# Patient Record
Sex: MALE | Race: WHITE | NOT HISPANIC OR LATINO | URBAN - METROPOLITAN AREA
[De-identification: names, ages, dates, MRNs, and addresses within clinical notes are randomized per-mention and may not be internally consistent; named-entity substitution may affect disease eponyms.]

---

## 2022-09-22 ENCOUNTER — INPATIENT (INPATIENT)
Facility: HOSPITAL | Age: 77
LOS: 1 days | Discharge: HOME | DRG: 291 | End: 2022-09-24
Attending: INTERNAL MEDICINE | Admitting: INTERNAL MEDICINE
Payer: MEDICARE

## 2022-09-22 VITALS
WEIGHT: 134.92 LBS | SYSTOLIC BLOOD PRESSURE: 104 MMHG | TEMPERATURE: 99 F | OXYGEN SATURATION: 97 % | HEIGHT: 63 IN | RESPIRATION RATE: 17 BRPM | DIASTOLIC BLOOD PRESSURE: 69 MMHG | HEART RATE: 92 BPM

## 2022-09-22 DIAGNOSIS — Z90.79 ACQUIRED ABSENCE OF OTHER GENITAL ORGAN(S): Chronic | ICD-10-CM

## 2022-09-22 DIAGNOSIS — I48.92 UNSPECIFIED ATRIAL FLUTTER: ICD-10-CM

## 2022-09-22 DIAGNOSIS — J44.9 CHRONIC OBSTRUCTIVE PULMONARY DISEASE, UNSPECIFIED: ICD-10-CM

## 2022-09-22 DIAGNOSIS — I50.9 HEART FAILURE, UNSPECIFIED: ICD-10-CM

## 2022-09-22 LAB
ACANTHOCYTES BLD QL SMEAR: SLIGHT — SIGNIFICANT CHANGE UP
ALBUMIN SERPL ELPH-MCNC: 3.5 G/DL — SIGNIFICANT CHANGE UP (ref 3.3–5)
ALP SERPL-CCNC: 97 U/L — SIGNIFICANT CHANGE UP (ref 40–120)
ALT FLD-CCNC: 64 U/L — HIGH (ref 10–45)
ANION GAP SERPL CALC-SCNC: 13 MMOL/L — SIGNIFICANT CHANGE UP (ref 5–17)
ANISOCYTOSIS BLD QL: SLIGHT — SIGNIFICANT CHANGE UP
APPEARANCE UR: CLEAR — SIGNIFICANT CHANGE UP
APTT BLD: 28.7 SEC — SIGNIFICANT CHANGE UP (ref 27.5–35.5)
AST SERPL-CCNC: 42 U/L — HIGH (ref 10–40)
BACTERIA # UR AUTO: PRESENT /HPF
BASOPHILS # BLD AUTO: 0 K/UL — SIGNIFICANT CHANGE UP (ref 0–0.2)
BASOPHILS NFR BLD AUTO: 0 % — SIGNIFICANT CHANGE UP (ref 0–2)
BILIRUB SERPL-MCNC: 1.1 MG/DL — SIGNIFICANT CHANGE UP (ref 0.2–1.2)
BILIRUB UR-MCNC: NEGATIVE — SIGNIFICANT CHANGE UP
BUN SERPL-MCNC: 16 MG/DL — SIGNIFICANT CHANGE UP (ref 7–23)
CALCIUM SERPL-MCNC: 9.2 MG/DL — SIGNIFICANT CHANGE UP (ref 8.4–10.5)
CHLORIDE SERPL-SCNC: 100 MMOL/L — SIGNIFICANT CHANGE UP (ref 96–108)
CK MB CFR SERPL CALC: 1.2 NG/ML — SIGNIFICANT CHANGE UP (ref 0–6.7)
CK SERPL-CCNC: 44 U/L — SIGNIFICANT CHANGE UP (ref 30–200)
CO2 SERPL-SCNC: 26 MMOL/L — SIGNIFICANT CHANGE UP (ref 22–31)
COLOR SPEC: YELLOW — SIGNIFICANT CHANGE UP
CREAT SERPL-MCNC: 0.63 MG/DL — SIGNIFICANT CHANGE UP (ref 0.5–1.3)
DACRYOCYTES BLD QL SMEAR: SLIGHT — SIGNIFICANT CHANGE UP
DIFF PNL FLD: NEGATIVE — SIGNIFICANT CHANGE UP
EGFR: 99 ML/MIN/1.73M2 — SIGNIFICANT CHANGE UP
ELLIPTOCYTES BLD QL SMEAR: SLIGHT — SIGNIFICANT CHANGE UP
EOSINOPHIL # BLD AUTO: 0.32 K/UL — SIGNIFICANT CHANGE UP (ref 0–0.5)
EOSINOPHIL NFR BLD AUTO: 2.6 % — SIGNIFICANT CHANGE UP (ref 0–6)
EPI CELLS # UR: SIGNIFICANT CHANGE UP /HPF (ref 0–5)
GIANT PLATELETS BLD QL SMEAR: PRESENT — SIGNIFICANT CHANGE UP
GLUCOSE SERPL-MCNC: 138 MG/DL — HIGH (ref 70–99)
GLUCOSE UR QL: NEGATIVE — SIGNIFICANT CHANGE UP
HCT VFR BLD CALC: 36.1 % — LOW (ref 39–50)
HGB BLD-MCNC: 11.6 G/DL — LOW (ref 13–17)
INR BLD: 1.16 — SIGNIFICANT CHANGE UP (ref 0.88–1.16)
KETONES UR-MCNC: NEGATIVE — SIGNIFICANT CHANGE UP
LEUKOCYTE ESTERASE UR-ACNC: NEGATIVE — SIGNIFICANT CHANGE UP
LYMPHOCYTES # BLD AUTO: 0.96 K/UL — LOW (ref 1–3.3)
LYMPHOCYTES # BLD AUTO: 7.8 % — LOW (ref 13–44)
MANUAL SMEAR VERIFICATION: SIGNIFICANT CHANGE UP
MCHC RBC-ENTMCNC: 23.9 PG — LOW (ref 27–34)
MCHC RBC-ENTMCNC: 32.1 GM/DL — SIGNIFICANT CHANGE UP (ref 32–36)
MCV RBC AUTO: 74.4 FL — LOW (ref 80–100)
MICROCYTES BLD QL: SLIGHT — SIGNIFICANT CHANGE UP
MONOCYTES # BLD AUTO: 0.64 K/UL — SIGNIFICANT CHANGE UP (ref 0–0.9)
MONOCYTES NFR BLD AUTO: 5.2 % — SIGNIFICANT CHANGE UP (ref 2–14)
NEUTROPHILS # BLD AUTO: 10.4 K/UL — HIGH (ref 1.8–7.4)
NEUTROPHILS NFR BLD AUTO: 84.4 % — HIGH (ref 43–77)
NITRITE UR-MCNC: NEGATIVE — SIGNIFICANT CHANGE UP
NT-PROBNP SERPL-SCNC: 4187 PG/ML — HIGH (ref 0–300)
OVALOCYTES BLD QL SMEAR: SLIGHT — SIGNIFICANT CHANGE UP
PH UR: 5.5 — SIGNIFICANT CHANGE UP (ref 5–8)
PLAT MORPH BLD: ABNORMAL
PLATELET # BLD AUTO: 358 K/UL — SIGNIFICANT CHANGE UP (ref 150–400)
POLYCHROMASIA BLD QL SMEAR: SLIGHT — SIGNIFICANT CHANGE UP
POTASSIUM SERPL-MCNC: 3.6 MMOL/L — SIGNIFICANT CHANGE UP (ref 3.5–5.3)
POTASSIUM SERPL-SCNC: 3.6 MMOL/L — SIGNIFICANT CHANGE UP (ref 3.5–5.3)
PROT SERPL-MCNC: 6.4 G/DL — SIGNIFICANT CHANGE UP (ref 6–8.3)
PROT UR-MCNC: 30 MG/DL
PROTHROM AB SERPL-ACNC: 13.8 SEC — HIGH (ref 10.5–13.4)
RBC # BLD: 4.85 M/UL — SIGNIFICANT CHANGE UP (ref 4.2–5.8)
RBC # FLD: 15 % — HIGH (ref 10.3–14.5)
RBC BLD AUTO: ABNORMAL
RBC CASTS # UR COMP ASSIST: < 5 /HPF — SIGNIFICANT CHANGE UP
SARS-COV-2 RNA SPEC QL NAA+PROBE: NEGATIVE — SIGNIFICANT CHANGE UP
SCHISTOCYTES BLD QL AUTO: SLIGHT — SIGNIFICANT CHANGE UP
SODIUM SERPL-SCNC: 139 MMOL/L — SIGNIFICANT CHANGE UP (ref 135–145)
SP GR SPEC: >=1.03 — SIGNIFICANT CHANGE UP (ref 1–1.03)
TARGETS BLD QL SMEAR: SLIGHT — SIGNIFICANT CHANGE UP
TROPONIN T SERPL-MCNC: 0.01 NG/ML — SIGNIFICANT CHANGE UP (ref 0–0.01)
UROBILINOGEN FLD QL: 0.2 E.U./DL — SIGNIFICANT CHANGE UP
WBC # BLD: 12.32 K/UL — HIGH (ref 3.8–10.5)
WBC # FLD AUTO: 12.32 K/UL — HIGH (ref 3.8–10.5)
WBC UR QL: < 5 /HPF — SIGNIFICANT CHANGE UP

## 2022-09-22 PROCEDURE — 93970 EXTREMITY STUDY: CPT | Mod: 26

## 2022-09-22 PROCEDURE — 99285 EMERGENCY DEPT VISIT HI MDM: CPT | Mod: FS

## 2022-09-22 PROCEDURE — 71046 X-RAY EXAM CHEST 2 VIEWS: CPT | Mod: 26

## 2022-09-22 RX ORDER — AZITHROMYCIN 500 MG/1
500 TABLET, FILM COATED ORAL ONCE
Refills: 0 | Status: COMPLETED | OUTPATIENT
Start: 2022-09-22 | End: 2022-09-22

## 2022-09-22 RX ORDER — HEPARIN SODIUM 5000 [USP'U]/ML
INJECTION INTRAVENOUS; SUBCUTANEOUS
Qty: 25000 | Refills: 0 | Status: DISCONTINUED | OUTPATIENT
Start: 2022-09-22 | End: 2022-09-24

## 2022-09-22 RX ORDER — BUDESONIDE AND FORMOTEROL FUMARATE DIHYDRATE 160; 4.5 UG/1; UG/1
2 AEROSOL RESPIRATORY (INHALATION)
Refills: 0 | Status: DISCONTINUED | OUTPATIENT
Start: 2022-09-22 | End: 2022-09-24

## 2022-09-22 RX ORDER — TIOTROPIUM BROMIDE 18 UG/1
1 CAPSULE ORAL; RESPIRATORY (INHALATION) DAILY
Refills: 0 | Status: DISCONTINUED | OUTPATIENT
Start: 2022-09-22 | End: 2022-09-24

## 2022-09-22 RX ORDER — HEPARIN SODIUM 5000 [USP'U]/ML
2500 INJECTION INTRAVENOUS; SUBCUTANEOUS EVERY 6 HOURS
Refills: 0 | Status: DISCONTINUED | OUTPATIENT
Start: 2022-09-22 | End: 2022-09-24

## 2022-09-22 RX ORDER — FUROSEMIDE 40 MG
40 TABLET ORAL ONCE
Refills: 0 | Status: COMPLETED | OUTPATIENT
Start: 2022-09-22 | End: 2022-09-22

## 2022-09-22 RX ORDER — HEPARIN SODIUM 5000 [USP'U]/ML
5000 INJECTION INTRAVENOUS; SUBCUTANEOUS ONCE
Refills: 0 | Status: COMPLETED | OUTPATIENT
Start: 2022-09-22 | End: 2022-09-23

## 2022-09-22 RX ORDER — HEPARIN SODIUM 5000 [USP'U]/ML
5000 INJECTION INTRAVENOUS; SUBCUTANEOUS EVERY 6 HOURS
Refills: 0 | Status: DISCONTINUED | OUTPATIENT
Start: 2022-09-22 | End: 2022-09-24

## 2022-09-22 RX ORDER — FLUTICASONE FUROATE, UMECLIDINIUM BROMIDE AND VILANTEROL TRIFENATATE 200; 62.5; 25 UG/1; UG/1; UG/1
1 POWDER RESPIRATORY (INHALATION)
Qty: 0 | Refills: 0 | DISCHARGE

## 2022-09-22 RX ORDER — TIMOLOL 0.5 %
1 DROPS OPHTHALMIC (EYE)
Refills: 0 | Status: DISCONTINUED | OUTPATIENT
Start: 2022-09-22 | End: 2022-09-24

## 2022-09-22 RX ORDER — FUROSEMIDE 40 MG
40 TABLET ORAL
Refills: 0 | Status: DISCONTINUED | OUTPATIENT
Start: 2022-09-23 | End: 2022-09-24

## 2022-09-22 RX ORDER — LATANOPROST 0.05 MG/ML
1 SOLUTION/ DROPS OPHTHALMIC; TOPICAL
Qty: 0 | Refills: 0 | DISCHARGE

## 2022-09-22 RX ORDER — CEFTRIAXONE 500 MG/1
1000 INJECTION, POWDER, FOR SOLUTION INTRAMUSCULAR; INTRAVENOUS ONCE
Refills: 0 | Status: COMPLETED | OUTPATIENT
Start: 2022-09-22 | End: 2022-09-22

## 2022-09-22 RX ORDER — BUMETANIDE 0.25 MG/ML
1 INJECTION INTRAMUSCULAR; INTRAVENOUS
Qty: 0 | Refills: 0 | DISCHARGE

## 2022-09-22 RX ORDER — TIMOLOL 0.5 %
1 DROPS OPHTHALMIC (EYE)
Qty: 0 | Refills: 0 | DISCHARGE

## 2022-09-22 RX ORDER — LATANOPROST 0.05 MG/ML
1 SOLUTION/ DROPS OPHTHALMIC; TOPICAL AT BEDTIME
Refills: 0 | Status: DISCONTINUED | OUTPATIENT
Start: 2022-09-22 | End: 2022-09-24

## 2022-09-22 RX ADMIN — CEFTRIAXONE 100 MILLIGRAM(S): 500 INJECTION, POWDER, FOR SOLUTION INTRAMUSCULAR; INTRAVENOUS at 22:25

## 2022-09-22 RX ADMIN — AZITHROMYCIN 255 MILLIGRAM(S): 500 TABLET, FILM COATED ORAL at 22:44

## 2022-09-22 RX ADMIN — Medication 40 MILLIGRAM(S): at 22:44

## 2022-09-22 NOTE — ED ADULT NURSE REASSESSMENT NOTE - NS ED NURSE REASSESS COMMENT FT1
Patient is on his way to US on stretcher w/ SSA. Patient can go doppler off cardiac mon as per EARLENE Gusman.

## 2022-09-22 NOTE — ED PROVIDER NOTE - OBJECTIVE STATEMENT
78 y/o M pt with PMHx of HTN, lung ca s/p resection, COPD, emphysema, prostate CA s/p prostatectomy presents to ED c/o shortness of breath on exertion x2w and bilateral LE edema, sent in from primary care/cardiologist's office. Pt was on Norvasc, Metoprolol, and Chlorthalidone for his BP when he developed a swelling on his legs initially. He went to see his doctor and his meds was changed to Bumetanide.  Pt's swelling slightly subsided, but he still feels lousy and has frequent shortness of breath. He denies fever, chills, cough, abdominal pain, chest pain, or any other acute complaints. Per pt's son, his doctor did EKG in office today and was concerned it was abnormal. Doctor recommended pt come to [facility] ED for cardiac admission for further treatment. 78 y/o M pt with PMHx of HTN, lung ca s/p resection, COPD, emphysema, prostate CA s/p prostatectomy presents to ED c/o shortness of breath on exertion x2w and bilateral LE edema, sent in from primary care/cardiologist's office.  Pt's leg swelling slightly subsided for the past 4 days since he stopped Norvasc and started Bumetanide, but he still feels lousy, generally weak and has frequent shortness of breath. He denies fever, chills, cough, abdominal pain, chest pain, or any other acute complaints. Per pt's son, his doctor did EKG in office today and was concerned it was abnormal. Doctor recommended pt come to [facility] ED for cardiac admission for further treatment.

## 2022-09-22 NOTE — H&P ADULT - PROBLEM SELECTOR PLAN 1
p/w b/l LE edema and ARNOLD x2 weeks, bibasilar crackles in lower lung bases w/o wheezing. BNP 4187, Trop neg x1, CXR possible bullae in RLL (follow up official read), ECG atrial flutter HR 90bpm.  - Echocardiogram done at outpatient cards office: "severe MR, post directed, ?flail leaflet, ?endocarditis"  - repeat echocardiogram (ordered), possible PREETI for evaluation of MV and possible endocarditis  - NPO   - HOME Meds: Bumetanide 1 mg daily  - s/p Lasix 40 mg IV x1 in ED, hold home diuretic and continue diuresis with Lasix 40 mg IV BID   - core measures: strict I/Os and daily standing weights, fluid restriction <1.2L daily    #Severe MR  per MD note: echocardiogram in office revealing severe MR, ?flail leaflet, ?endocardititis  - follow up echocardiogram  - follow up blood cultures  - possible structural consult in AM

## 2022-09-22 NOTE — ED PROVIDER NOTE - NSICDXPASTMEDICALHX_GEN_ALL_CORE_FT
PAST MEDICAL HISTORY:  COPD (chronic obstructive pulmonary disease)     H/O emphysema     HTN (hypertension)     Lung cancer     Prostate CA

## 2022-09-22 NOTE — ED ADULT NURSE REASSESSMENT NOTE - NS ED NURSE REASSESS COMMENT FT1
Patient received from day shift RN, resting in bed, no complaints at this time. Family at bedside. Awaiting lab-r

## 2022-09-22 NOTE — H&P ADULT - PROBLEM SELECTOR PLAN 3
WBC: 12.32, afebrile. CXR with possible bullae in RLL (f/u official read) possible COPD exacerbation t/w Azithromycin 500 mg IV x1, Ceftriaxone 1G IV x1 for leukocytosis in ED  - follow up lactate in AM  - HOME Meds: Trelegy Ellipta  - continue with therapeutic interchange: Spiriva/Symbicort      Diet: NPO  DVT PPx: Heparin gtt  Code Status: FULL  Dispo: pending clinical course  PT consult ordered WBC: 12.32, afebrile. CXR with possible bullae v consolidation in RLL (f/u official read) t/w Azithromycin 500 mg IV x1, Ceftriaxone 1G IV x1 for leukocytosis in ED  - follow up lactate in AM  - c/w IV Ceftriaxone 2G daily as discussed with cardiology fellow

## 2022-09-22 NOTE — ED PROVIDER NOTE - CLINICAL SUMMARY MEDICAL DECISION MAKING FREE TEXT BOX
78 y/o M pt with PMHx of HTN, lung CA s/p resection, and prostate CA s/p prostatectomy presents to ED sent in from cardiologist's office for evaluation and most likely admission. Pt has dyspnea on exertion x 2w and bilateral LE edema. In ED, pt non-toxic appearing, VS stable, plan labs, EKG, CXR, cardiology consult, and most likely admit. 78 y/o M pt with PMHx of HTN, lung CA s/p resection, and prostate CA s/p prostatectomy presents to ED sent in from cardiologist's office for evaluation and most likely admission. Pt has dyspnea on exertion x 2w and bilateral LE edema. In ED, pt non-toxic appearing, VS stable, plan labs, EKG, CXR, cardiology consult, and most likely admit.  pt with new onset of CHF, A flutter with variable heart block, will admit to cardiology for further management, diuresis

## 2022-09-22 NOTE — ED PROVIDER NOTE - MUSCULOSKELETAL, MLM
Spine appears normal, range of motion is not limited, no muscle or joint tenderness, good pulses to bilateral LE, mild pedal edema, L > R.

## 2022-09-22 NOTE — H&P ADULT - NSHPSOCIALHISTORY_GEN_ALL_CORE
Tobacco: former smoker, quit 40 years ago  ETOH: denies  Drug Use: denies  Occupation: , full time  Marital Status:   Emergency Contact/HCP: Alan Argueta (son) [phone]

## 2022-09-22 NOTE — ED PROVIDER NOTE - NS ED ATTENDING STATEMENT MOD
This was a shared visit with the JUANPABLO. I reviewed and verified the documentation and independently performed the documented:

## 2022-09-22 NOTE — ED ADULT NURSE NOTE - OBJECTIVE STATEMENT
Pt presents to ED with cardiology referral for suspected endocarditis. Pt has noticed bilateral lower extremity swelling and intermittent SOB. Pt had echo and EKG done by cardiologist that were "abnormal" per pt report. Pt denies chest pain, fevers/chills, nausea, or dizziness. Pt resting on stretcher.

## 2022-09-22 NOTE — H&P ADULT - PROBLEM SELECTOR PLAN 4
- HOME Meds: Trelegy Ellipta  - continue with therapeutic interchange: Spiriva/Symbicort      Diet: NPO  DVT PPx: Heparin gtt  Code Status: FULL  Dispo: pending clinical course  PT consult ordered

## 2022-09-22 NOTE — H&P ADULT - HISTORY OF PRESENT ILLNESS
78 y/o Mohawk speaking male, former smoker (quit 40 yrs ago) with PMHx of HTN, lung ca s/p RUL lobectomy (02/2021 @ [facility]), COPD, emphysema, prostate CA s/p prostatectomy (03/2015 @ [facility]) who presents to [facility] ED with c/o shortness of breath on exertion x2 weeks and bilateral LE edema, sent in from primary care/cardiologist's office.  Pt's leg swelling slightly subsided for the past 4 days since he stopped Norvasc and started Bumetanide, but he still feels lousy, generally weak/fatigue and has frequent shortness of breath. He denies fever, chills, cough, abdominal pain, chest pain, or any other acute complaints. Per pt's son, his doctor did EKG in office today and was concerned it was abnormal. Doctor recommended pt come to [facility] ED for cardiac admission for further treatment.    Upon arrival to the ED patient's workup revealed: T98.4, /71, HR85, RR18, SpO2 99% on RA. Labs significant for WBC 12.32, H/H 11.6/36.1, AST/ALT 42/64, Troponin neg x1, BNP 4187, UA neg, COVID PCR neg. ECG atrial flutter 90 bpm. CXR possible bullae in RLL (follow up official read). Patient t/w Azithromycin 500 mg IV x1, Ceftriaxone 1G IV x1 for leukocytosis, and Lasix 40 mg IV x1 in the ED. Patient is now admitted to [facility] for new onset rate controlled atrial flutter and new onset CHF exacerbation.

## 2022-09-22 NOTE — ED PROVIDER NOTE - RESPIRATORY, MLM
Scattered wheezes, no labored breathing, no rales, no rhonchi. no labored breathing,  scattered crackles and wheezes, no rhonchi.

## 2022-09-22 NOTE — H&P ADULT - NSHPLABSRESULTS_GEN_ALL_CORE
11.6   12.32 )-----------( 358      ( 22 Sep 2022 20:11 )             36.1       09-22    139  |  100  |  16  ----------------------------<  138<H>  3.6   |  26  |  0.63    Ca    9.2      22 Sep 2022 20:11    TPro  6.4  /  Alb  3.5  /  TBili  1.1  /  DBili  x   /  AST  42<H>  /  ALT  64<H>  /  AlkPhos  97  09-22      PT/INR - ( 22 Sep 2022 20:11 )   PT: 13.8 sec;   INR: 1.16          PTT - ( 22 Sep 2022 20:11 )  PTT:28.7 sec    CARDIAC MARKERS ( 22 Sep 2022 20:11 )  x     / 0.01 ng/mL / 44 U/L / x     / 1.2 ng/mL        Urinalysis Basic - ( 22 Sep 2022 21:41 )    Color: Yellow / Appearance: Clear / SG: >=1.030 / pH: x  Gluc: x / Ketone: NEGATIVE  / Bili: Negative / Urobili: 0.2 E.U./dL   Blood: x / Protein: 30 mg/dL / Nitrite: NEGATIVE   Leuk Esterase: NEGATIVE / RBC: < 5 /HPF / WBC < 5 /HPF   Sq Epi: x / Non Sq Epi: 0-5 /HPF / Bacteria: Present /HPF

## 2022-09-22 NOTE — H&P ADULT - NS ATTEND AMEND GEN_ALL_CORE FT
Initial attending contact date 9.23.22 on morning bedside rounds. See attending addendum to HPI here for initial attending contact documentation.   See PA note written above, for details. I reviewed the PA documentation.  I have personally seen and examined this patient today. I reviewed vitals, labs, medications, cardiac studies and additional imaging.  I agree with the PA's findings and plans as written above with the following additions/amendments:  77M former smoker with essential HTN, lung ca s/p RUL lobectomy (02/2021 @ [facility]), COPD, emphysema, prostate CA s/p prostatectomy who presented with report of abnormal outpatient TTE notable for severe MR with possible flail leaflet. On presentation patient noted to be in new onset AFlutter, with elevated BNP 4K on admission, JAYESH and SOB.  He was given 40IV lasix and started on 40 IV lasix BID. CXR with possible infiltrate, initiated on CTX for possible CAP.  Patient admitted to cardiac telemetry for further  management.  ROS: Patient denies cardiopulmonary symptoms after overnight diuresis. The patient specifically denies CP, SOB, ARNOLD, orthopnea.  Physical Exam notable for: elderly patient laying flat at zero degrees in bed in NAD, flat neck veins, clear lungs, soft abdomen, no fluid wave detected, no pretibial pitting edema, no ankle edema, skin WWP  Plan for:  NPO for PREETI today  Heparin gtt for new onset aflutter rate controlled  Cont IV abx for coverage of suspected CAP  Lasix 40 IV BID today 9/23, anticipate transition to po diuretics on 9/24 AM  CTSx Dr Pena team ocnsulted for evaluation of flail  MV with 4+ MR  Will need EP consult for eval of new onset aflutter which is likely driven by MV disease  F/U infectious work up for leukocytosis, blood cultures NGTD  Antonio Calderon M.D.  Cardiology Attending

## 2022-09-22 NOTE — H&P ADULT - PROBLEM SELECTOR PLAN 2
currently in rate controlled atrial flutter, HR 80s, EKG: atrial flutter 90 bpm  - follow up AM ECG  - follow up AM thyroid function panel  - follow up echocardiogram, possible PREETI/DCCV (consult EP in AM)  - NPO after MN  - start Heparin gtt

## 2022-09-22 NOTE — H&P ADULT - NSICDXPASTMEDICALHX_GEN_ALL_CORE_FT
PAST MEDICAL HISTORY:  COPD (chronic obstructive pulmonary disease)     H/O emphysema     HTN (hypertension)     Lung cancer     Prostate CA      Home

## 2022-09-22 NOTE — H&P ADULT - ASSESSMENT
76 y/o Slovenian speaking male, former smoker (quit 40 yrs ago) with PMHx of HTN, lung ca s/p RUL lobectomy (02/2021 @ [facility]), COPD, emphysema, prostate CA s/p prostatectomy (03/2015 @ [facility]) who presents to [facility] ED with c/o shortness of breath on exertion x2 weeks and bilateral LE edema, sent in from primary care/cardiologist's office.  Pt's leg swelling slightly subsided for the past 4 days since he stopped Norvasc and started Bumetanide, but he still feels lousy, generally weak/fatigue and has frequent shortness of breath. He denies fever, chills, cough, abdominal pain, chest pain, or any other acute complaints. Per pt's son, his doctor did EKG in office today and was concerned it was abnormal. Doctor recommended pt come to [facility] ED for cardiac admission for further treatment.    Upon arrival to the ED patient's workup revealed: T98.4, /71, HR85, RR18, SpO2 99% on RA. Labs significant for WBC 12.32, H/H 11.6/36.1, AST/ALT 42/64, Troponin neg x1, BNP 4187, UA neg, COVID PCR neg. ECG atrial flutter 90 bpm. CXR possible bullae in RLL (follow up official read). Patient t/w Azithromycin 500 mg IV x1, Ceftriaxone 1G IV x1 for leukocytosis, and Lasix 40 mg IV x1 in the ED. Patient is now admitted to [facility] for new onset rate controlled atrial flutter and new onset CHF exacerbation.    Per MD note:  Echocardiogram 09/2022: severe MR post directed, ? flail leaflet, ? endocarditis

## 2022-09-22 NOTE — ED ADULT NURSE NOTE - NS ED NURSE TRANSPORT WITH
Cardiac Monitor/Defib/ACLS/Rescue Kit/O2/BVM/IV pump/ACLS Rescue Kit heparin drip, verified w/ RN Tatiana/Cardiac Monitor/Defib/ACLS/Rescue Kit/O2/BVM/IV pump/ACLS Rescue Kit

## 2022-09-22 NOTE — ED ADULT TRIAGE NOTE - CHIEF COMPLAINT QUOTE
Pt walked into ED per cards. pt c/o SOB, LE edema x2 weeks, and "concerning echo." "my white blood cells were 13 and he said he's concerned about endocarditis so he told me to come right away." Denies any fevers, coughing, or chest pain.

## 2022-09-23 DIAGNOSIS — I34.0 NONRHEUMATIC MITRAL (VALVE) INSUFFICIENCY: ICD-10-CM

## 2022-09-23 DIAGNOSIS — D72.829 ELEVATED WHITE BLOOD CELL COUNT, UNSPECIFIED: ICD-10-CM

## 2022-09-23 DIAGNOSIS — I50.33 ACUTE ON CHRONIC DIASTOLIC (CONGESTIVE) HEART FAILURE: ICD-10-CM

## 2022-09-23 LAB
A1C WITH ESTIMATED AVERAGE GLUCOSE RESULT: 6 % — HIGH (ref 4–5.6)
ANION GAP SERPL CALC-SCNC: 12 MMOL/L — SIGNIFICANT CHANGE UP (ref 5–17)
APTT BLD: 51.6 SEC — HIGH (ref 27.5–35.5)
APTT BLD: 62.6 SEC — HIGH (ref 27.5–35.5)
APTT BLD: 66.5 SEC — HIGH (ref 27.5–35.5)
BASOPHILS # BLD AUTO: 0.1 K/UL — SIGNIFICANT CHANGE UP (ref 0–0.2)
BASOPHILS NFR BLD AUTO: 0.6 % — SIGNIFICANT CHANGE UP (ref 0–2)
BUN SERPL-MCNC: 17 MG/DL — SIGNIFICANT CHANGE UP (ref 7–23)
CALCIUM SERPL-MCNC: 9.2 MG/DL — SIGNIFICANT CHANGE UP (ref 8.4–10.5)
CHLORIDE SERPL-SCNC: 99 MMOL/L — SIGNIFICANT CHANGE UP (ref 96–108)
CHOLEST SERPL-MCNC: 126 MG/DL — SIGNIFICANT CHANGE UP
CO2 SERPL-SCNC: 26 MMOL/L — SIGNIFICANT CHANGE UP (ref 22–31)
CREAT SERPL-MCNC: 0.69 MG/DL — SIGNIFICANT CHANGE UP (ref 0.5–1.3)
EGFR: 96 ML/MIN/1.73M2 — SIGNIFICANT CHANGE UP
EOSINOPHIL # BLD AUTO: 0.34 K/UL — SIGNIFICANT CHANGE UP (ref 0–0.5)
EOSINOPHIL NFR BLD AUTO: 2.1 % — SIGNIFICANT CHANGE UP (ref 0–6)
ESTIMATED AVERAGE GLUCOSE: 126 MG/DL — HIGH (ref 68–114)
GLUCOSE SERPL-MCNC: 124 MG/DL — HIGH (ref 70–99)
HCT VFR BLD CALC: 37.1 % — LOW (ref 39–50)
HCV AB S/CO SERPL IA: 0.03 S/CO — SIGNIFICANT CHANGE UP
HCV AB SERPL-IMP: SIGNIFICANT CHANGE UP
HDLC SERPL-MCNC: 50 MG/DL — SIGNIFICANT CHANGE UP
HGB BLD-MCNC: 11.8 G/DL — LOW (ref 13–17)
IMM GRANULOCYTES NFR BLD AUTO: 0.4 % — SIGNIFICANT CHANGE UP (ref 0–0.9)
LDH SERPL L TO P-CCNC: 209 U/L — SIGNIFICANT CHANGE UP (ref 50–242)
LIPID PNL WITH DIRECT LDL SERPL: 64 MG/DL — SIGNIFICANT CHANGE UP
LYMPHOCYTES # BLD AUTO: 0.89 K/UL — LOW (ref 1–3.3)
LYMPHOCYTES # BLD AUTO: 5.6 % — LOW (ref 13–44)
MAGNESIUM SERPL-MCNC: 2.1 MG/DL — SIGNIFICANT CHANGE UP (ref 1.6–2.6)
MCHC RBC-ENTMCNC: 23.6 PG — LOW (ref 27–34)
MCHC RBC-ENTMCNC: 31.8 GM/DL — LOW (ref 32–36)
MCV RBC AUTO: 74.2 FL — LOW (ref 80–100)
MONOCYTES # BLD AUTO: 1.33 K/UL — HIGH (ref 0–0.9)
MONOCYTES NFR BLD AUTO: 8.3 % — SIGNIFICANT CHANGE UP (ref 2–14)
NEUTROPHILS # BLD AUTO: 13.22 K/UL — HIGH (ref 1.8–7.4)
NEUTROPHILS NFR BLD AUTO: 83 % — HIGH (ref 43–77)
NON HDL CHOLESTEROL: 76 MG/DL — SIGNIFICANT CHANGE UP
NRBC # BLD: 0 /100 WBCS — SIGNIFICANT CHANGE UP (ref 0–0)
PLATELET # BLD AUTO: 371 K/UL — SIGNIFICANT CHANGE UP (ref 150–400)
POTASSIUM SERPL-MCNC: 3.9 MMOL/L — SIGNIFICANT CHANGE UP (ref 3.5–5.3)
POTASSIUM SERPL-SCNC: 3.9 MMOL/L — SIGNIFICANT CHANGE UP (ref 3.5–5.3)
RBC # BLD: 5 M/UL — SIGNIFICANT CHANGE UP (ref 4.2–5.8)
RBC # FLD: 14.8 % — HIGH (ref 10.3–14.5)
SODIUM SERPL-SCNC: 137 MMOL/L — SIGNIFICANT CHANGE UP (ref 135–145)
T4 AB SER-ACNC: 8.35 UG/DL — SIGNIFICANT CHANGE UP (ref 4.5–11.7)
TRIGL SERPL-MCNC: 59 MG/DL — SIGNIFICANT CHANGE UP
TSH SERPL-MCNC: 1.18 UIU/ML — SIGNIFICANT CHANGE UP (ref 0.27–4.2)
WBC # BLD: 15.95 K/UL — HIGH (ref 3.8–10.5)
WBC # FLD AUTO: 15.95 K/UL — HIGH (ref 3.8–10.5)

## 2022-09-23 PROCEDURE — 76377 3D RENDER W/INTRP POSTPROCES: CPT | Mod: 26

## 2022-09-23 PROCEDURE — 99223 1ST HOSP IP/OBS HIGH 75: CPT

## 2022-09-23 PROCEDURE — 93312 ECHO TRANSESOPHAGEAL: CPT | Mod: 26

## 2022-09-23 RX ORDER — CEFTRIAXONE 500 MG/1
2000 INJECTION, POWDER, FOR SOLUTION INTRAMUSCULAR; INTRAVENOUS
Refills: 0 | Status: DISCONTINUED | OUTPATIENT
Start: 2022-09-23 | End: 2022-09-24

## 2022-09-23 RX ADMIN — Medication 40 MILLIGRAM(S): at 06:00

## 2022-09-23 RX ADMIN — HEPARIN SODIUM 1200 UNIT(S)/HR: 5000 INJECTION INTRAVENOUS; SUBCUTANEOUS at 18:54

## 2022-09-23 RX ADMIN — BUDESONIDE AND FORMOTEROL FUMARATE DIHYDRATE 2 PUFF(S): 160; 4.5 AEROSOL RESPIRATORY (INHALATION) at 09:19

## 2022-09-23 RX ADMIN — Medication 1 DROP(S): at 06:56

## 2022-09-23 RX ADMIN — HEPARIN SODIUM 1200 UNIT(S)/HR: 5000 INJECTION INTRAVENOUS; SUBCUTANEOUS at 20:27

## 2022-09-23 RX ADMIN — BUDESONIDE AND FORMOTEROL FUMARATE DIHYDRATE 2 PUFF(S): 160; 4.5 AEROSOL RESPIRATORY (INHALATION) at 21:30

## 2022-09-23 RX ADMIN — Medication 1 DROP(S): at 18:17

## 2022-09-23 RX ADMIN — HEPARIN SODIUM 1200 UNIT(S)/HR: 5000 INJECTION INTRAVENOUS; SUBCUTANEOUS at 21:53

## 2022-09-23 RX ADMIN — LATANOPROST 1 DROP(S): 0.05 SOLUTION/ DROPS OPHTHALMIC; TOPICAL at 21:09

## 2022-09-23 RX ADMIN — HEPARIN SODIUM 1100 UNIT(S)/HR: 5000 INJECTION INTRAVENOUS; SUBCUTANEOUS at 08:45

## 2022-09-23 RX ADMIN — CEFTRIAXONE 100 MILLIGRAM(S): 500 INJECTION, POWDER, FOR SOLUTION INTRAMUSCULAR; INTRAVENOUS at 06:00

## 2022-09-23 RX ADMIN — HEPARIN SODIUM 1100 UNIT(S)/HR: 5000 INJECTION INTRAVENOUS; SUBCUTANEOUS at 07:10

## 2022-09-23 RX ADMIN — HEPARIN SODIUM 1100 UNIT(S)/HR: 5000 INJECTION INTRAVENOUS; SUBCUTANEOUS at 00:25

## 2022-09-23 RX ADMIN — HEPARIN SODIUM 5000 UNIT(S): 5000 INJECTION INTRAVENOUS; SUBCUTANEOUS at 00:25

## 2022-09-23 RX ADMIN — HEPARIN SODIUM 1200 UNIT(S)/HR: 5000 INJECTION INTRAVENOUS; SUBCUTANEOUS at 15:39

## 2022-09-23 RX ADMIN — Medication 40 MILLIGRAM(S): at 15:40

## 2022-09-23 RX ADMIN — HEPARIN SODIUM 2500 UNIT(S): 5000 INJECTION INTRAVENOUS; SUBCUTANEOUS at 15:52

## 2022-09-23 RX ADMIN — HEPARIN SODIUM 1100 UNIT(S)/HR: 5000 INJECTION INTRAVENOUS; SUBCUTANEOUS at 01:11

## 2022-09-23 NOTE — PROGRESS NOTE ADULT - PROBLEM SELECTOR PLAN 5
- HOME Meds: Trelegy Ellipta  - continue with therapeutic interchange: Spiriva/Symbicort      DVT PPx: Heparin gtt  Code Status: FULL  Dispo: pending clinical course  PT consult ordered - HOME Meds: Trelegy Ellipta  - continue with therapeutic interchange: Spiriva/Symbicort      DVT PPx: Heparin gtt  Code Status: FULL  Dispo: pending clinical course  PT consult ordered    Case d/w Dr. Anderson - HOME Meds: Trelegy Ellipta  - continue with therapeutic interchange: Spiriva/Symbicort      DVT PPx: Heparin gtt  Code Status: FULL  Dispo: pending clinical progression  PT consult ordered    Case d/w Dr. Anderson

## 2022-09-23 NOTE — PHYSICAL THERAPY INITIAL EVALUATION ADULT - GENERAL OBSERVATIONS, REHAB EVAL
Pt received semi supine in bed with +EKG, +IV, NAD, son present. Pt left as found, NAD, line intact, call bell in reach, son present, RN Nathalia currie.

## 2022-09-23 NOTE — PROGRESS NOTE ADULT - PROBLEM SELECTOR PLAN 1
-bibasilar crackles, mild JVD, trace b/l LE edema  - Outpatient echo 09/22: EF 57%, mild concentric LVH, severe eccentric posteriorly directed MR possible flail leaflet mitral anterior leaflet, moderate TR, mild pulm HTN  -continue diuresis with Lasix 40 mg IV BID   - core measures: strict I/Os and daily standing weights, fluid restriction <1.2L daily

## 2022-09-23 NOTE — CONSULT NOTE ADULT - SUBJECTIVE AND OBJECTIVE BOX
Surgeon: Dr. Corrales    Requesting Physician: Dr. Anderson    HISTORY OF PRESENT ILLNESS:  78 y/o Cuban speaking male, former smoker (quit 40 yrs ago) with PMHx of HTN, lung ca s/p RUL lobectomy (02/2021 @ [facility]), COPD, emphysema, prostate CA s/p prostatectomy (03/2015 @ [facility]) who presents to [facility] ED with c/o shortness of breath on exertion x2 weeks and bilateral LE edema, sent in from primary care/cardiologist's office.  Pt's leg swelling slightly subsided for the past 4 days since he stopped Norvasc and started Bumetanide. Per pt's son, his doctor did EKG in office today and was concerned it was abnormal. Doctor recommended pt come to [facility] ED for cardiac admission for further treatment. ECG in ER with atrial flutter 90 bpm. CXR with concern for possible bullae in RLL treated with Azithromycin 500 mg IV x1, Ceftriaxone 1G IV x1 for leukocytosis, and Lasix 40 mg IV x1 in the ED. Patient is now admitted to [facility] for new onset rate controlled atrial flutter and new onset CHF exacerbation. Structural Heart consulted 2/2 outside ECHO showing severe MR with concern for flail mitral leaflet vs vegetation. CTS Consulted for input on appropriate surgical intervention and planning.    Patient seen at bedside this morning, endorsing no acute complaints. Admits to ARNOLD, but patient denies current CP, SOB, Palpitations, PND, Dizziness, N/V, Fever, Night Sweats, Chills.       PAST MEDICAL & SURGICAL HISTORY:  HTN (hypertension)      Lung cancer      COPD (chronic obstructive pulmonary disease)      H/O emphysema      Prostate CA      H/O prostatectomy          MEDICATIONS  (STANDING):  budesonide  80 MICROgram(s)/formoterol 4.5 MICROgram(s) Inhaler 2 Puff(s) Inhalation two times a day  cefTRIAXone   IVPB 2000 milliGRAM(s) IV Intermittent <User Schedule>  furosemide   Injectable 40 milliGRAM(s) IV Push two times a day  heparin  Infusion.  Unit(s)/Hr (11 mL/Hr) IV Continuous <Continuous>  latanoprost 0.005% Ophthalmic Solution 1 Drop(s) Both EYES at bedtime  timolol 0.5% Solution 1 Drop(s) Both EYES two times a day  tiotropium 18 MICROgram(s) Capsule 1 Capsule(s) Inhalation daily    MEDICATIONS  (PRN):  heparin   Injectable 5000 Unit(s) IV Push every 6 hours PRN For aPTT less than 40  heparin   Injectable 2500 Unit(s) IV Push every 6 hours PRN For aPTT between 40 - 57      Allergies    lisinopril (Unknown)    Intolerances        SOCIAL HISTORY:  Smoker:  Former, Quit 40 years ago  ETOH use:  No  Ilicit Drug use:  No  Occupation:   Assisted device use (Cane / Walker): No  Live with: Wife    FAMILY HISTORY:  Family history of CVA (Father)        Review of Systems:  CONSTITUTIONAL: Denies fevers / chills, sweats, fatigue, weight loss, weight gain                                       NEURO:  Denies paresthesias, seizures, syncope, confusion                                                                                  EYES:  Denies blurry vision, discharge, pain, loss of vision                                                                                    ENT:  Denies difficulty hearing, vertigo, dysphagia, epistaxis, recent dental work                                       CV:  Admits to ARNOLD, Denies chest pain, palpitations, orthopnea                                                                                           RESPIRATORY:  Denies wheezing, SOB, cough / sputum, hemoptysis                                                               GI:  Denies nausea, vomiting, diarrhea, constipation, melena                                                                          : Denies hematuria, dysuria, urgency, incontinence                                                                                          MUSCULOSKELETAL  Denies arthritis, joint swelling, muscle weakness                                                             SKIN/BREAST:  Denies rash, itching, hair loss, masses                                                                                              PSYCH:  Denies depression, anxiety, suicidal ideation                                                                                                HEME/LYMPH:  Denies bruises easily, enlarged lymph nodes, tender lymph nodes                                          ENDOCRINE:  Denies cold intolerance, heat intolerance, polydipsia                                                                      Vital Signs Last 24 Hrs  T(C): 36.3 (23 Sep 2022 10:12), Max: 37.3 (22 Sep 2022 19:31)  T(F): 97.3 (23 Sep 2022 10:12), Max: 99.2 (22 Sep 2022 19:31)  HR: 83 (23 Sep 2022 11:54) (80 - 96)  BP: 103/65 (23 Sep 2022 11:54) (99/65 - 113/77)  BP(mean): --  RR: 16 (23 Sep 2022 11:54) (16 - 18)  SpO2: 95% (23 Sep 2022 11:54) (94% - 99%)    Parameters below as of 23 Sep 2022 11:54  Patient On (Oxygen Delivery Method): room air        Physical Exam  Appearance: Normal, laying supine in bed, NAD  HEENT:   Normal oral mucosa, PERRL, EOMI	  Neck: Supple, - JVD; - Carotid Bruit   Cardiovascular: Normal S1 S2. No JVD. Systolic Murmur noted best heard over the apex.  Respiratory: Lungs with mild crackles b/l. No Rales, Rhonchi, Wheezing.	  Gastrointestinal:  Soft, non-tender, + BS.	  Skin: No rashes. No ecchymoses. No cyanosis.  Extremities: Trace pitting edema b/l lower extremities. Normal range of motion, no clubbing, cyanosis.  Vascular: Peripheral pulses palpable 2+ bilaterally.  Neurologic: Non-focal.  Psychiatry: A & O x 3, mood & affect appropriate.                                                           LABS:                        11.8   15.95 )-----------( 371      ( 23 Sep 2022 06:53 )             37.1     09-23    137  |  99  |  17  ----------------------------<  124<H>  3.9   |  26  |  0.69    Ca    9.2      23 Sep 2022 06:55  Mg     2.1     09-23    TPro  6.4  /  Alb  3.5  /  TBili  1.1  /  DBili  x   /  AST  42<H>  /  ALT  64<H>  /  AlkPhos  97  09-22    PT/INR - ( 22 Sep 2022 20:11 )   PT: 13.8 sec;   INR: 1.16          PTT - ( 23 Sep 2022 06:53 )  PTT:62.6 sec  Urinalysis Basic - ( 22 Sep 2022 21:41 )    Color: Yellow / Appearance: Clear / SG: >=1.030 / pH: x  Gluc: x / Ketone: NEGATIVE  / Bili: Negative / Urobili: 0.2 E.U./dL   Blood: x / Protein: 30 mg/dL / Nitrite: NEGATIVE   Leuk Esterase: NEGATIVE / RBC: < 5 /HPF / WBC < 5 /HPF   Sq Epi: x / Non Sq Epi: 0-5 /HPF / Bacteria: Present /HPF      CARDIAC MARKERS ( 22 Sep 2022 20:11 )  x     / 0.01 ng/mL / 44 U/L / x     / 1.2 ng/mL      RADIOLOGY & ADDITIONAL STUDIES:    CXR:  < from: Xray Chest 2 Views PA/Lat (09.22.22 @ 20:59) >  impression: Cardiomegaly, thoracic aortic calcification. Right basilar   focal atelectasis, costophrenic angle pleural thickening, elevation of   the right hemidiaphragm. Right hilar surgical clips.. Right seventh rib   partial resection.. Thoracic spine degenerative changes.  < end of copied text >    US:  < from: US Duplex Venous Lower Ext Complete, Bilateral (09.22.22 @ 23:43) >  IMPRESSION:  No evidence of deep venous thrombosis in either lower extremity.  < end of copied text >  
Surgeon: Dr. Pena    Requesting Physician: Dr. Anderson    HISTORY OF PRESENT ILLNESS:  78 y/o Kyrgyz speaking male, former smoker (quit 40 yrs ago) with PMHx of HTN, lung ca s/p RUL lobectomy (02/2021 @ [facility]), COPD, emphysema, prostate CA s/p prostatectomy (03/2015 @ [facility]) who presents to [facility] ED with c/o shortness of breath on exertion x2 weeks and bilateral LE edema, sent in from primary care/cardiologist's office.  Pt's leg swelling slightly subsided for the past 4 days since he stopped Norvasc and started Bumetanide. Per pt's son, his doctor did EKG in office today and was concerned it was abnormal. Doctor recommended pt come to [facility] ED for cardiac admission for further treatment. ECG in ER with atrial flutter 90 bpm. CXR with concern for possible bullae in RLL treated with Azithromycin 500 mg IV x1, Ceftriaxone 1G IV x1 for leukocytosis, and Lasix 40 mg IV x1 in the ED. Patient is now admitted to [facility] for new onset rate controlled atrial flutter and new onset CHF exacerbation. Structural Heart consulted 2/2 outside ECHO showing severe MR with concern for flail mitral leaflet vs vegetation.     Patient seen at bedside this morning, endorsing no acute complaints. Admits to ARNOLD, but patient denies current CP, SOB, Palpitations, PND, Dizziness, N/V, Fever, Night Sweats, Chills.       PAST MEDICAL & SURGICAL HISTORY:  HTN (hypertension)      Lung cancer      COPD (chronic obstructive pulmonary disease)      H/O emphysema      Prostate CA      H/O prostatectomy          MEDICATIONS  (STANDING):  budesonide  80 MICROgram(s)/formoterol 4.5 MICROgram(s) Inhaler 2 Puff(s) Inhalation two times a day  cefTRIAXone   IVPB 2000 milliGRAM(s) IV Intermittent <User Schedule>  furosemide   Injectable 40 milliGRAM(s) IV Push two times a day  heparin  Infusion.  Unit(s)/Hr (11 mL/Hr) IV Continuous <Continuous>  latanoprost 0.005% Ophthalmic Solution 1 Drop(s) Both EYES at bedtime  timolol 0.5% Solution 1 Drop(s) Both EYES two times a day  tiotropium 18 MICROgram(s) Capsule 1 Capsule(s) Inhalation daily    MEDICATIONS  (PRN):  heparin   Injectable 5000 Unit(s) IV Push every 6 hours PRN For aPTT less than 40  heparin   Injectable 2500 Unit(s) IV Push every 6 hours PRN For aPTT between 40 - 57      Allergies    lisinopril (Unknown)    Intolerances        SOCIAL HISTORY:  Smoker:  Former, Quit 40 years ago  ETOH use:  No  Ilicit Drug use:  No  Occupation:   Assisted device use (Cane / Walker): No  Live with: Wife    FAMILY HISTORY:  Family history of CVA (Father)        Review of Systems:  CONSTITUTIONAL: Denies fevers / chills, sweats, fatigue, weight loss, weight gain                                       NEURO:  Denies paresthesias, seizures, syncope, confusion                                                                                  EYES:  Denies blurry vision, discharge, pain, loss of vision                                                                                    ENT:  Denies difficulty hearing, vertigo, dysphagia, epistaxis, recent dental work                                       CV:  Admits to ARNOLD, Denies chest pain, palpitations, orthopnea                                                                                           RESPIRATORY:  Denies wheezing, SOB, cough / sputum, hemoptysis                                                               GI:  Denies nausea, vomiting, diarrhea, constipation, melena                                                                          : Denies hematuria, dysuria, urgency, incontinence                                                                                          MUSCULOSKELETAL  Denies arthritis, joint swelling, muscle weakness                                                             SKIN/BREAST:  Denies rash, itching, hair loss, masses                                                                                              PSYCH:  Denies depression, anxiety, suicidal ideation                                                                                                HEME/LYMPH:  Denies bruises easily, enlarged lymph nodes, tender lymph nodes                                          ENDOCRINE:  Denies cold intolerance, heat intolerance, polydipsia                                                                      Vital Signs Last 24 Hrs  T(C): 36.3 (23 Sep 2022 10:12), Max: 37.3 (22 Sep 2022 19:31)  T(F): 97.3 (23 Sep 2022 10:12), Max: 99.2 (22 Sep 2022 19:31)  HR: 83 (23 Sep 2022 11:54) (80 - 96)  BP: 103/65 (23 Sep 2022 11:54) (99/65 - 113/77)  BP(mean): --  RR: 16 (23 Sep 2022 11:54) (16 - 18)  SpO2: 95% (23 Sep 2022 11:54) (94% - 99%)    Parameters below as of 23 Sep 2022 11:54  Patient On (Oxygen Delivery Method): room air        Physical Exam  Appearance: Normal, laying supine in bed, NAD  HEENT:   Normal oral mucosa, PERRL, EOMI	  Neck: Supple, - JVD; - Carotid Bruit   Cardiovascular: Normal S1 S2. No JVD. Systolic Murmur noted best heard over the apex.  Respiratory: Lungs with mild crackles b/l. No Rales, Rhonchi, Wheezing.	  Gastrointestinal:  Soft, non-tender, + BS.	  Skin: No rashes. No ecchymoses. No cyanosis.  Extremities: Trace pitting edema b/l lower extremities. Normal range of motion, no clubbing, cyanosis.  Vascular: Peripheral pulses palpable 2+ bilaterally.  Neurologic: Non-focal.  Psychiatry: A & O x 3, mood & affect appropriate.                                                           LABS:                        11.8   15.95 )-----------( 371      ( 23 Sep 2022 06:53 )             37.1     09-23    137  |  99  |  17  ----------------------------<  124<H>  3.9   |  26  |  0.69    Ca    9.2      23 Sep 2022 06:55  Mg     2.1     09-23    TPro  6.4  /  Alb  3.5  /  TBili  1.1  /  DBili  x   /  AST  42<H>  /  ALT  64<H>  /  AlkPhos  97  09-22    PT/INR - ( 22 Sep 2022 20:11 )   PT: 13.8 sec;   INR: 1.16          PTT - ( 23 Sep 2022 06:53 )  PTT:62.6 sec  Urinalysis Basic - ( 22 Sep 2022 21:41 )    Color: Yellow / Appearance: Clear / SG: >=1.030 / pH: x  Gluc: x / Ketone: NEGATIVE  / Bili: Negative / Urobili: 0.2 E.U./dL   Blood: x / Protein: 30 mg/dL / Nitrite: NEGATIVE   Leuk Esterase: NEGATIVE / RBC: < 5 /HPF / WBC < 5 /HPF   Sq Epi: x / Non Sq Epi: 0-5 /HPF / Bacteria: Present /HPF      CARDIAC MARKERS ( 22 Sep 2022 20:11 )  x     / 0.01 ng/mL / 44 U/L / x     / 1.2 ng/mL      RADIOLOGY & ADDITIONAL STUDIES:    CXR:  < from: Xray Chest 2 Views PA/Lat (09.22.22 @ 20:59) >  impression: Cardiomegaly, thoracic aortic calcification. Right basilar   focal atelectasis, costophrenic angle pleural thickening, elevation of   the right hemidiaphragm. Right hilar surgical clips.. Right seventh rib   partial resection.. Thoracic spine degenerative changes.  < end of copied text >    US:  < from: US Duplex Venous Lower Ext Complete, Bilateral (09.22.22 @ 23:43) >  IMPRESSION:  No evidence of deep venous thrombosis in either lower extremity.  < end of copied text >

## 2022-09-23 NOTE — PHYSICAL THERAPY INITIAL EVALUATION ADULT - ASSISTIVE DEVICE FOR STAIR TRANSFER, REHAB EVAL
Pt needs return to work clearance letter she  Started back to work on July 11th. Send to the Attn of Jane at fax [phone]. Thanks!   none

## 2022-09-23 NOTE — PHYSICAL THERAPY INITIAL EVALUATION ADULT - PERTINENT HX OF CURRENT PROBLEM, REHAB EVAL
Pt is a 78 yo male who presents to [facility] ED with c/o shortness of breath on exertion x2 weeks and bilateral LE edema, sent in from primary care/cardiologist's office. Continue IV diuresis, CTS consulted for severe MR.

## 2022-09-23 NOTE — PATIENT PROFILE ADULT - VISION (WITH CORRECTIVE LENSES IF THE PATIENT USUALLY WEARS THEM):
Nephrology  Patient: Samy Jiménez Date:2018   : 1956 Attending: James Lazo MD   61 year old male        Chief complaint: back pain, Anasarca/fluid overload; CKD4; uncontrolled HTN    HPI:from initial consult  This is a 61 year old male with a past medical history significant for DM HTN CKD4 fibromyalgia Hep C presents c/o back/generalized pain .  In ed-noted with uncontrolled HTN on admit and volume overload/anasarca. Started overnight on lasix drip. difficult to get history from pt-he keeps asking for pain meds. Pt is not a good historian. He was admitted with similar c/o on  and left AMA at that time    Nephrology consult is requested by [place] for management of  CKD, and fluid electrolyte issues.   His creatinine has been slowly worsening over last one year. Followed by Dr. Yin. L AVF placed on 18 for future RRT. Last creat was 3.0 during last hospitalization.  Creat is now at 3.85. Wt is up(from 103.2 on 18 upto 113.4 kg now). Denies N/V, anorexia, CP etc    Subjective/recent events:  18- does not feel well. c/o LE pain, Edema and SOB. Making more urine. bp's better today    Past Medical History:   Diagnosis Date   • Chronic kidney disease    • Congestive cardiac failure (CMS/HCC)    • Depression    • Diabetes mellitus (CMS/HCC)    • Diastolic dysfunction     Per 2015 ECHO Care Everywhere   • Essential (primary) hypertension    • Fibromyalgia    • HCV (hepatitis C virus)    • Other chronic pain    • Ulcer     bleeding ulcer       Past Surgical History:   Procedure Laterality Date   • Abdomen surgery     • Removal gallbladder     • Toe amputation Left     Foot I&D, open 1st / 2nd toe amps       Social History     Social History   • Marital status:      Spouse name: N/A   • Number of children: N/A   • Years of education: N/A     Occupational History   • Not on file.     Social History Main Topics   • Smoking status: Current Every Day Smoker     Packs/day: 0.25      Years: 10.00     Types: Cigarettes   • Smokeless tobacco: Never Used   • Alcohol use 0.0 oz/week      Comment: socially   • Drug use: No   • Sexual activity: Not on file     Other Topics Concern   • Not on file     Social History Narrative   • No narrative on file       Family History   Problem Relation Age of Onset   • Hypertension Mother    • Hypertension Father        ALLERGIES:   Allergen Reactions   • Pineapple Other (See Comments)     Tongue swelling and soreness     • Amlodipine Other (See Comments)     Pt states he has regurgitation from it so he stopped taking medication   • Codeine NAUSEA   • Penicillins          Review of Systems:  Positives stated above in HPI.  Full ROS completed and is otherwise negative.     Vital Last Value 24 Hour Range   Temperature 97.8 °F (36.6 °C) Temp  Min: 97.7 °F (36.5 °C)  Max: 98.6 °F (37 °C)   Pulse 78 Pulse  Min: 70  Max: 78   Respiratory 20 Resp  Min: 18  Max: 20   Blood Pressure 122/80 BP  Min: 122/80  Max: 223/111   Art BP   No Data Recorded   Pulse Oximetry 99 % SpO2  Min: 93 %  Max: 99 %     Vital Today Admitted   Weight 113.4 kg Weight: 113.8 kg   Height N/A Height: 5' 11.5\" (181.6 cm)   BMI N/A BMI (Calculated): 34.58     Weight over the past 48 Hours:  Patient Vitals for the past 48 hrs:   Weight   05/14/18 2034 113.8 kg   05/15/18 0343 113.4 kg        Hemodynamics:      Last Value 24 Hour Range   CVP   No Data Recorded   PAS/PAD   No Data Recorded   PCWP   No Data Recorded   CO   No Data Recorded   CI   No Data Recorded   SVR   No Data Recorded   SV02   No Data Recorded     Intake/Output:    Last Stool Occurrence: 1 (05/15/18 2125)    I/O this shift:  In: 300 [P.O.:300]  Out: 400 [Urine:400]    I/O last 3 completed shifts:  In: 1154.7 [P.O.:980; I.V.:174.7]  Out: 2775 [Urine:2775]      Intake/Output Summary (Last 24 hours) at 05/16/18 0829  Last data filed at 05/16/18 0800   Gross per 24 hour   Intake          1454.71 ml   Output             3175 ml   Net          -1720.29 ml       Medications/Infusions:  Prescriptions Prior to Admission   Medication Sig Dispense Refill   • Insulin Glargine (LANTUS SOLOSTAR SC) Inject 30 Units into the skin nightly.     • aspirin 81 MG tablet Take 81 mg by mouth daily.     • potassium chloride (K-DUR,KLOR-CON) 20 MEQ CR tablet Take 1 tablet by mouth 2 times daily. 180 tablet 1   • furosemide (LASIX) 80 MG tablet Take 1 tablet by mouth 2 times daily. 180 tablet 1   • lisinopril (ZESTRIL) 20 MG tablet Take 1 tablet by mouth daily. 90 tablet 1   • tamsulosin (FLOMAX) 0.4 MG Cap Take 0.4 mg by mouth daily after a meal.     • Cyanocobalamin (VITAMIN B 12 PO) Take 2,000 mcg by mouth.     • Omega-3 Fatty Acids (FISH OIL) 1000 MG capsule Take 1 g by mouth daily.     • Cholecalciferol (VITAMIN D3 PO) Take 1 tablet by mouth daily.      • Lancet Devices (AUTO-LANCET) MISC Use to check blood sugar  each 11   • Glucose Blood (BLOOD GLUCOSE TEST STRIPS) STRP Use to test blood sugar  strip 11   • Blood Glucose Monitoring Suppl (BLOOD GLUCOSE METER) KIT Use to test blood sugar TID for DM 1 kit 0   • vitamin E 1000 UNITS capsule Take 1,000 Units by mouth daily.       • metOLazone  5 mg Oral BID   • sodium bicarbonate  650 mg Oral BID   • sodium chloride (PF)  2 mL Injection Once   • potassium chloride  20 mEq Oral BID   • tamsulosin  0.4 mg Oral Daily PC   • aspirin  81 mg Oral Daily   • insulin glargine  30 Units Subcutaneous Nightly   • insulin lispro   Subcutaneous TID AC   • gabapentin  100 mg Oral TID   • nicotine  1 patch Transdermal Daily    And   • nicotine  1 patch Transdermal Daily   • cloNIDine  0.1 mg Oral 2 times per day   • sodium chloride (PF)  2 mL Injection 2 times per day   • heparin (porcine)  5,000 Units Subcutaneous 3 times per day   • albuterol-ipratropium 2.5 mg/0.5 mg  3 mL Nebulization 4x Daily Resp   • lisinopril  20 mg Oral Daily     • furosemide (LASIX) 250 mg in sodium chloride 0.9 % 125 mL total volume infusion 10  mg/hr (05/16/18 0554)   • dextrose 5 % infusion         Physical Exam:  General: Alert,  no acute distress  HEENT: Head atraumatic, normocephalic.  Moist oral mucus membranes.  Sclera non-icteric. Less Facial puffiness  Neck: Supple, Trachea midline. No thyromegaly  Chest/Lungs: Normal effort.  Symmetrical expansion.  occ crackles at bases  CVS: . S1,S2 well heard.  no pericardial rub heard.  Abdomen: BS well heard. Soft, non tender, non distended.  No hepatosplenomegaly.  Neuro: No focal neurological deficit.  Awake,answering questions  Skin: Warm, dry, intact.  No rashes.   Extremities:  No clubbing or cyanosis. 3+ tight generalized edema-NO CHANGE. LUE-AVF+Thrill      Laboratory Results:    Recent Labs  Lab 05/16/18  0615 05/15/18  0327 05/14/18  1504   SODIUM 141 142 143   POTASSIUM 3.9 4.2 4.3   CHLORIDE 114* 117* 116*   CO2 19* 17* 19*   ANIONGAP 12 12 12   BUN 54* 57* 57*   CREATININE 3.71* 3.85* 3.90*   GFRNA 17 16 16   GFRA 19 18 18   GLUCOSE 83 227* 248*   CALCIUM 7.7* 7.6* 7.7*   ALBUMIN  --  1.4* 1.6*   PHOS 5.2*  --   --    MG  --  1.8  --    AST  --  29 24   GPT  --  19 21   ALKPT  --  76 83   BILIRUBIN  --  0.2 0.2   BNP  --   --  1,907*         Recent Labs  Lab 05/15/18  0820 05/14/18  1504   WBC 8.4 10.5   HGB 10.0* 10.6*   HCT 31.1* 32.3*    261       No results found    No results found    Invalid input(s): ANASCREENWIT    Urine Panel    Recent Labs  Lab 05/15/18  0346   USPG 1.022   UPH 5.5   UKET NEGATIVE   UPROT 300*   UGLU 500*   UBILI NEGATIVE   UROB 0.2   UWBC NEGATIVE   UBACTR NONE SEEN   UNITR NEGATIVE   URBC TRACE*       Imaging/Procedures:  CXR  IMPRESSION: Small bilateral pleural effusions with suspected atelectasis or  infiltrate in the lower lungs.          Impression, Plan & Recommendations:  · CKD stage 4 :secondary to diabetic nephropathy and hypertension. His creatinine is upto 3.7-3.8 mg/dl. His disease is progressing. He has a maturing avf. True GFR is probably lower  given volume overload now. Monitor with diuresis. If fluid overload does not respond to diuretics, will need to start HD/RRT. D/w pt-pt understands and consents for HD  · Avoid B's, venipunctures L arm sec to L avf  · Anasarca/fluid overload:. Making more urine, but still with significant anasarca Will increase lasix drip to 20mg/hr and cont metolozone.   · HTN: uncontrolled on admit. Suspect med non compliance PTA. bp's overall improved now. Monitor on current meds  · Anemia: from CKD. Will consider EWA once hgb is <10. Will check iron studies  · Bone-mineral issues: checking  PTH, vItd and po4 levels  · Metabolic acidosis: sec to CKD.  started on po sod bicarbonate     D/w   Pt and AINTA Meadows MD     Normal vision: sees adequately in most situations; can see medication labels, newsprint

## 2022-09-23 NOTE — PHYSICAL THERAPY INITIAL EVALUATION ADULT - GAIT DEVIATIONS NOTED, PT EVAL
slightly lateral sway, no lose of balance, decreased heel strike and hip flexion bilaterally, pt denies SOB, palpitation, chest pain, dizziness, or lightheaded t/o ambulation.

## 2022-09-23 NOTE — PHYSICAL THERAPY INITIAL EVALUATION ADULT - ADDITIONAL COMMENTS
pt lives with spouse in an apt ~ 16 steps walk up. Prior to admission, pt ambulated independently without AD. Pt denies recent fall.

## 2022-09-23 NOTE — PROGRESS NOTE ADULT - PROBLEM SELECTOR PLAN 4
WBC: 12.32 on admission, this AM 15.95 afebrile and asymptomatic  - c/w IV Ceftriaxone 2G daily as discussed with cardiology fellow, Incentive spirometry  -UA and COVID negative   -CXR 09/22 Cardiomegaly, thoracic aortic calcification. Right basilar focal atelectasis, costophrenic angle pleural thickening, elevation of the right hemidiaphragm. Right hilar surgical clips.. Right seventh rib partial resection.. Thoracic spine degenerative changes  -f/u blood cultures

## 2022-09-23 NOTE — PROGRESS NOTE ADULT - ASSESSMENT
78 y/o Japanese speaking male, former smoker (quit 40 yrs ago) with PMHx of HTN, lung ca s/p RUL lobectomy (02/2021 @ [facility]), COPD, emphysema, prostate CA s/p prostatectomy (03/2015 @ [facility]) who presents to [facility] ED with c/o shortness of breath on exertion x2 weeks and bilateral LE edema, sent in from primary care/cardiologist's office. Continue IV diuresis, CTS consulted for severe MR.

## 2022-09-23 NOTE — CONSULT NOTE ADULT - ASSESSMENT
Assessment:  76 y/o New Zealander speaking male, former smoker (quit 40 yrs ago) with PMHx of HTN, lung ca s/p RUL lobectomy (02/2021 @ [facility]), COPD, emphysema, prostate CA s/p prostatectomy (03/2015 @ [facility]) who presents to [facility] ED with c/o shortness of breath on exertion x2 weeks and bilateral LE edema, sent in from primary care/cardiologist's office.  Pt's leg swelling slightly subsided for the past 4 days since he stopped Norvasc and started Bumetanide. Per pt's son, his doctor did EKG in office today and was concerned it was abnormal. Doctor recommended pt come to [facility] ED for cardiac admission for further treatment. ECG in ER with atrial flutter 90 bpm. CXR with concern for possible bullae in RLL treated with Azithromycin 500 mg IV x1, Ceftriaxone 1G IV x1 for leukocytosis, and Lasix 40 mg IV x1 in the ED. Patient is now admitted to [facility] for new onset rate controlled atrial flutter and new onset CHF exacerbation. Structural Heart consulted 2/2 outside ECHO showing severe MR with concern for flail mitral leaflet vs vegetation.     Plan:  Problem 1: Severe MR       - Outpatient echo 09/22: EF 57%, mild concentric LVH, severe eccentric posteriorly directed MR possible flail leaflet mitral anterior leaflet, moderate TR, mild pulm HTN       - Concern for possible vegetation? WBC trending up (16 today), patient remains afebrile, on ABX        - Plan for PREETI today to better evaluate Mitral Valve. Please keep NPO for exam       - Follow up Blood Cx's, repeat surveillance cultures if patient becomes febrile       - PST for possible intervention, plan pending       - Discussed case with Dr. Pena        - Will Consult CTS Attending, Dr. Corrales, for operative planning    Problem 2: Acute HF Exacerbation        - b/l LE edema improving with diuresis       - Lasix 40 mg IV BID        - Strict I/Os and daily weights       - Plan per Primary Team    Problem 3:Problem: Atrial flutter.        - Rate controlled with HR in 90s       - On heparin gtt.       - Last PTT within therapeutic range       - Care per Primary Team    Problem 4: COPD (chronic obstructive pulmonary disease).        - HOME Meds: Trelegy Ellipta       - Currently on Spiriva/Symbicort as inpatient        - Care per Primary Team    I have reviewed clinical labs tests and reports, radiology tests and reports, as well as old patient medical records, and discussed with the referring physician.    
Assessment:  78 y/o Bahraini speaking male, former smoker (quit 40 yrs ago) with PMHx of HTN, lung ca s/p RUL lobectomy (02/2021 @ [facility]), COPD, emphysema, prostate CA s/p prostatectomy (03/2015 @ [facility]) who presents to [facility] ED with c/o shortness of breath on exertion x2 weeks and bilateral LE edema, sent in from primary care/cardiologist's office.  Pt's leg swelling slightly subsided for the past 4 days since he stopped Norvasc and started Bumetanide. Per pt's son, his doctor did EKG in office today and was concerned it was abnormal. Doctor recommended pt come to [facility] ED for cardiac admission for further treatment. ECG in ER with atrial flutter 90 bpm. CXR with concern for possible bullae in RLL treated with Azithromycin 500 mg IV x1, Ceftriaxone 1G IV x1 for leukocytosis, and Lasix 40 mg IV x1 in the ED. Patient is now admitted to [facility] for new onset rate controlled atrial flutter and new onset CHF exacerbation. Structural Heart consulted 2/2 outside ECHO showing severe MR with concern for flail mitral leaflet vs vegetation. CTS Consulted for input on appropriate surgical intervention and planning.    Plan:  Problem 1: Severe MR       - Outpatient echo 09/22: EF 57%, mild concentric LVH, severe eccentric posteriorly directed MR possible flail leaflet mitral anterior leaflet, moderate TR, mild pulm HTN       - Concern for possible vegetation? WBC trending up (16 today), patient remains afebrile, on ABX        - Plan for PREETI today to better evaluate Mitral Valve. Please keep NPO for exam       - Follow up Blood Cx's, repeat surveillance cultures if patient becomes febrile       - PST for possible intervention, plan pending       - Dr. Corrales to see Patient     Problem 2: Acute HF Exacerbation        - b/l LE edema improving with diuresis       - Lasix 40 mg IV BID        - Strict I/Os and daily weights       - Plan per Primary Team    Problem 3:Problem: Atrial flutter.        - Rate controlled with HR in 90s       - On heparin gtt.       - Last PTT within therapeutic range       - Care per Primary Team    Problem 4: COPD (chronic obstructive pulmonary disease).        - HOME Meds: Trelegy Ellipta       - Currently on Spiriva/Symbicort as inpatient        - Care per Primary Team    I have reviewed clinical labs tests and reports, radiology tests and reports, as well as old patient medical records, and discussed with the referring physician.

## 2022-09-23 NOTE — PHYSICAL THERAPY INITIAL EVALUATION ADULT - MANUAL MUSCLE TESTING RESULTS, REHAB EVAL
b/l UEs ~4+/5 t/o except L shoulder flexion~3+/5(as per pt, pt had rotator cuff injunry on L shoulder)

## 2022-09-23 NOTE — PROGRESS NOTE ADULT - SUBJECTIVE AND OBJECTIVE BOX
Interventional Cardiology PA Adult Progress Note    Subjective Assessment: Patient examined at bedside this AM, feeling well. States SOB and LE edema improving. Denies CP or palpitations.   	  MEDICATIONS:  furosemide   Injectable 40 milliGRAM(s) IV Push two times a day  cefTRIAXone   IVPB 2000 milliGRAM(s) IV Intermittent <User Schedule>  budesonide  80 MICROgram(s)/formoterol 4.5 MICROgram(s) Inhaler 2 Puff(s) Inhalation two times a day  tiotropium 18 MICROgram(s) Capsule 1 Capsule(s) Inhalation daily  heparin   Injectable 5000 Unit(s) IV Push every 6 hours PRN  heparin   Injectable 2500 Unit(s) IV Push every 6 hours PRN  heparin  Infusion.  Unit(s)/Hr IV Continuous <Continuous>  latanoprost 0.005% Ophthalmic Solution 1 Drop(s) Both EYES at bedtime  timolol 0.5% Solution 1 Drop(s) Both EYES two times a day      	    [PHYSICAL EXAM:  TELEMETRY:  T(C): 36.3 (09-23-22 @ 10:12), Max: 37.3 (09-22-22 @ 19:31)  HR: 80 (09-23-22 @ 08:52) (80 - 96)  BP: 100/63 (09-23-22 @ 08:52) (99/65 - 113/77)  RR: 17 (09-23-22 @ 08:52) (17 - 18)  SpO2: 94% (09-23-22 @ 08:52) (94% - 99%)    I&O's Summary    22 Sep 2022 07:01  -  23 Sep 2022 07:00  --------------------------------------------------------  IN: 0 mL / OUT: 575 mL / NET: -575 mL    23 Sep 2022 07:01  -  23 Sep 2022 10:58  --------------------------------------------------------  IN: 33 mL / OUT: 0 mL / NET: 33 mL      Height (cm): 160 (09-22 @ 19:31)  Weight (kg): 61.2 (09-22 @ 19:31)  BMI (kg/m2): 23.9 (09-22 @ 19:31)  BSA (m2): 1.64 (09-22 @ 19:31)    Appearance: Normal	  HEENT:   Normal oral mucosa, PERRL, EOMI	  Neck: Supple, + JVD  Cardiovascular: Normal S1 S2, No JVD, systolic murmur radiating to L axilla  Respiratory: bibasilar rales   Gastrointestinal:  Soft, Non-tender, + BS	  Skin: No rashes, No ecchymoses, No cyanosis  Extremities: trace b/l LE edema   Vascular: Peripheral pulses palpable 2+ bilaterally  Neurologic: Non-focal  Psychiatry: A & O x 3, Mood & affect appropriate      	      LABS:	 	                          11.8   15.95 )-----------( 371      ( 23 Sep 2022 06:53 )             37.1     09-23    137  |  99  |  17  ----------------------------<  124<H>  3.9   |  26  |  0.69    Ca    9.2      23 Sep 2022 06:55  Mg     2.1     09-23    TPro  6.4  /  Alb  3.5  /  TBili  1.1  /  DBili  x   /  AST  42<H>  /  ALT  64<H>  /  AlkPhos  97  09-22    proBNP: Serum Pro-Brain Natriuretic Peptide: 4187 pg/mL (09-22 @ 20:11)    TSH: Thyroid Stimulating Hormone, Serum: 1.180 uIU/mL (09-23 @ 06:55)    PT/INR - ( 22 Sep 2022 20:11 )   PT: 13.8 sec;   INR: 1.16          PTT - ( 23 Sep 2022 06:53 )  PTT:62.6 sec

## 2022-09-23 NOTE — PATIENT PROFILE ADULT - FALL HARM RISK - HARM RISK INTERVENTIONS

## 2022-09-24 VITALS — TEMPERATURE: 97 F

## 2022-09-24 LAB
ANION GAP SERPL CALC-SCNC: 13 MMOL/L — SIGNIFICANT CHANGE UP (ref 5–17)
ANION GAP SERPL CALC-SCNC: 14 MMOL/L — SIGNIFICANT CHANGE UP (ref 5–17)
APTT BLD: 59.4 SEC — HIGH (ref 27.5–35.5)
BASOPHILS # BLD AUTO: 0.07 K/UL — SIGNIFICANT CHANGE UP (ref 0–0.2)
BASOPHILS NFR BLD AUTO: 0.5 % — SIGNIFICANT CHANGE UP (ref 0–2)
BUN SERPL-MCNC: 21 MG/DL — SIGNIFICANT CHANGE UP (ref 7–23)
BUN SERPL-MCNC: 22 MG/DL — SIGNIFICANT CHANGE UP (ref 7–23)
CALCIUM SERPL-MCNC: 9 MG/DL — SIGNIFICANT CHANGE UP (ref 8.4–10.5)
CALCIUM SERPL-MCNC: 9 MG/DL — SIGNIFICANT CHANGE UP (ref 8.4–10.5)
CHLORIDE SERPL-SCNC: 101 MMOL/L — SIGNIFICANT CHANGE UP (ref 96–108)
CHLORIDE SERPL-SCNC: 99 MMOL/L — SIGNIFICANT CHANGE UP (ref 96–108)
CO2 SERPL-SCNC: 26 MMOL/L — SIGNIFICANT CHANGE UP (ref 22–31)
CO2 SERPL-SCNC: 28 MMOL/L — SIGNIFICANT CHANGE UP (ref 22–31)
CREAT SERPL-MCNC: 0.74 MG/DL — SIGNIFICANT CHANGE UP (ref 0.5–1.3)
CREAT SERPL-MCNC: 0.8 MG/DL — SIGNIFICANT CHANGE UP (ref 0.5–1.3)
EGFR: 92 ML/MIN/1.73M2 — SIGNIFICANT CHANGE UP
EGFR: 94 ML/MIN/1.73M2 — SIGNIFICANT CHANGE UP
EOSINOPHIL # BLD AUTO: 0.46 K/UL — SIGNIFICANT CHANGE UP (ref 0–0.5)
EOSINOPHIL NFR BLD AUTO: 3.1 % — SIGNIFICANT CHANGE UP (ref 0–6)
GLUCOSE SERPL-MCNC: 131 MG/DL — HIGH (ref 70–99)
GLUCOSE SERPL-MCNC: 251 MG/DL — HIGH (ref 70–99)
HCT VFR BLD CALC: 36.5 % — LOW (ref 39–50)
HGB BLD-MCNC: 11.4 G/DL — LOW (ref 13–17)
IMM GRANULOCYTES NFR BLD AUTO: 0.6 % — SIGNIFICANT CHANGE UP (ref 0–0.9)
LYMPHOCYTES # BLD AUTO: 0.59 K/UL — LOW (ref 1–3.3)
LYMPHOCYTES # BLD AUTO: 4 % — LOW (ref 13–44)
MAGNESIUM SERPL-MCNC: 2.1 MG/DL — SIGNIFICANT CHANGE UP (ref 1.6–2.6)
MCHC RBC-ENTMCNC: 23.7 PG — LOW (ref 27–34)
MCHC RBC-ENTMCNC: 31.2 GM/DL — LOW (ref 32–36)
MCV RBC AUTO: 75.9 FL — LOW (ref 80–100)
MONOCYTES # BLD AUTO: 1.17 K/UL — HIGH (ref 0–0.9)
MONOCYTES NFR BLD AUTO: 8 % — SIGNIFICANT CHANGE UP (ref 2–14)
NEUTROPHILS # BLD AUTO: 12.25 K/UL — HIGH (ref 1.8–7.4)
NEUTROPHILS NFR BLD AUTO: 83.8 % — HIGH (ref 43–77)
NRBC # BLD: 0 /100 WBCS — SIGNIFICANT CHANGE UP (ref 0–0)
PLATELET # BLD AUTO: 389 K/UL — SIGNIFICANT CHANGE UP (ref 150–400)
POTASSIUM SERPL-MCNC: 2.9 MMOL/L — CRITICAL LOW (ref 3.5–5.3)
POTASSIUM SERPL-MCNC: 3.8 MMOL/L — SIGNIFICANT CHANGE UP (ref 3.5–5.3)
POTASSIUM SERPL-SCNC: 2.9 MMOL/L — CRITICAL LOW (ref 3.5–5.3)
POTASSIUM SERPL-SCNC: 3.8 MMOL/L — SIGNIFICANT CHANGE UP (ref 3.5–5.3)
RBC # BLD: 4.81 M/UL — SIGNIFICANT CHANGE UP (ref 4.2–5.8)
RBC # FLD: 15.1 % — HIGH (ref 10.3–14.5)
SODIUM SERPL-SCNC: 140 MMOL/L — SIGNIFICANT CHANGE UP (ref 135–145)
SODIUM SERPL-SCNC: 141 MMOL/L — SIGNIFICANT CHANGE UP (ref 135–145)
WBC # BLD: 14.63 K/UL — HIGH (ref 3.8–10.5)
WBC # FLD AUTO: 14.63 K/UL — HIGH (ref 3.8–10.5)

## 2022-09-24 PROCEDURE — 96375 TX/PRO/DX INJ NEW DRUG ADDON: CPT

## 2022-09-24 PROCEDURE — 97161 PT EVAL LOW COMPLEX 20 MIN: CPT

## 2022-09-24 PROCEDURE — 71045 X-RAY EXAM CHEST 1 VIEW: CPT | Mod: 26

## 2022-09-24 PROCEDURE — 80061 LIPID PANEL: CPT

## 2022-09-24 PROCEDURE — 83615 LACTATE (LD) (LDH) ENZYME: CPT

## 2022-09-24 PROCEDURE — 84484 ASSAY OF TROPONIN QUANT: CPT

## 2022-09-24 PROCEDURE — 93970 EXTREMITY STUDY: CPT

## 2022-09-24 PROCEDURE — 85025 COMPLETE CBC W/AUTO DIFF WBC: CPT

## 2022-09-24 PROCEDURE — 87040 BLOOD CULTURE FOR BACTERIA: CPT

## 2022-09-24 PROCEDURE — 99285 EMERGENCY DEPT VISIT HI MDM: CPT

## 2022-09-24 PROCEDURE — 83880 ASSAY OF NATRIURETIC PEPTIDE: CPT

## 2022-09-24 PROCEDURE — 94640 AIRWAY INHALATION TREATMENT: CPT

## 2022-09-24 PROCEDURE — 81001 URINALYSIS AUTO W/SCOPE: CPT

## 2022-09-24 PROCEDURE — 85730 THROMBOPLASTIN TIME PARTIAL: CPT

## 2022-09-24 PROCEDURE — 84443 ASSAY THYROID STIM HORMONE: CPT

## 2022-09-24 PROCEDURE — 84436 ASSAY OF TOTAL THYROXINE: CPT

## 2022-09-24 PROCEDURE — 87635 SARS-COV-2 COVID-19 AMP PRB: CPT

## 2022-09-24 PROCEDURE — 83735 ASSAY OF MAGNESIUM: CPT

## 2022-09-24 PROCEDURE — 36415 COLL VENOUS BLD VENIPUNCTURE: CPT

## 2022-09-24 PROCEDURE — 80053 COMPREHEN METABOLIC PANEL: CPT

## 2022-09-24 PROCEDURE — 85610 PROTHROMBIN TIME: CPT

## 2022-09-24 PROCEDURE — 99239 HOSP IP/OBS DSCHRG MGMT >30: CPT

## 2022-09-24 PROCEDURE — 96374 THER/PROPH/DIAG INJ IV PUSH: CPT

## 2022-09-24 PROCEDURE — 83036 HEMOGLOBIN GLYCOSYLATED A1C: CPT

## 2022-09-24 PROCEDURE — 82550 ASSAY OF CK (CPK): CPT

## 2022-09-24 PROCEDURE — 71046 X-RAY EXAM CHEST 2 VIEWS: CPT

## 2022-09-24 PROCEDURE — 71045 X-RAY EXAM CHEST 1 VIEW: CPT

## 2022-09-24 PROCEDURE — 93312 ECHO TRANSESOPHAGEAL: CPT

## 2022-09-24 PROCEDURE — 86803 HEPATITIS C AB TEST: CPT

## 2022-09-24 PROCEDURE — 80048 BASIC METABOLIC PNL TOTAL CA: CPT

## 2022-09-24 PROCEDURE — 85027 COMPLETE CBC AUTOMATED: CPT

## 2022-09-24 PROCEDURE — 82553 CREATINE MB FRACTION: CPT

## 2022-09-24 RX ORDER — CEFPODOXIME PROXETIL 100 MG
1 TABLET ORAL
Qty: 10 | Refills: 0
Start: 2022-09-24 | End: 2022-09-28

## 2022-09-24 RX ORDER — APIXABAN 2.5 MG/1
5 TABLET, FILM COATED ORAL EVERY 12 HOURS
Refills: 0 | Status: DISCONTINUED | OUTPATIENT
Start: 2022-09-24 | End: 2022-09-24

## 2022-09-24 RX ORDER — POTASSIUM CHLORIDE 20 MEQ
20 PACKET (EA) ORAL ONCE
Refills: 0 | Status: DISCONTINUED | OUTPATIENT
Start: 2022-09-24 | End: 2022-09-24

## 2022-09-24 RX ORDER — METOPROLOL TARTRATE 50 MG
0.5 TABLET ORAL
Qty: 30 | Refills: 0
Start: 2022-09-24 | End: 2022-10-23

## 2022-09-24 RX ORDER — POTASSIUM CHLORIDE 20 MEQ
40 PACKET (EA) ORAL ONCE
Refills: 0 | Status: DISCONTINUED | OUTPATIENT
Start: 2022-09-24 | End: 2022-09-24

## 2022-09-24 RX ORDER — POTASSIUM CHLORIDE 20 MEQ
40 PACKET (EA) ORAL ONCE
Refills: 0 | Status: COMPLETED | OUTPATIENT
Start: 2022-09-24 | End: 2022-09-24

## 2022-09-24 RX ORDER — POTASSIUM CHLORIDE 20 MEQ
20 PACKET (EA) ORAL ONCE
Refills: 0 | Status: COMPLETED | OUTPATIENT
Start: 2022-09-24 | End: 2022-09-24

## 2022-09-24 RX ORDER — APIXABAN 2.5 MG/1
1 TABLET, FILM COATED ORAL
Qty: 180 | Refills: 0
Start: 2022-09-24 | End: 2022-12-22

## 2022-09-24 RX ADMIN — HEPARIN SODIUM 1200 UNIT(S)/HR: 5000 INJECTION INTRAVENOUS; SUBCUTANEOUS at 04:10

## 2022-09-24 RX ADMIN — HEPARIN SODIUM 1200 UNIT(S)/HR: 5000 INJECTION INTRAVENOUS; SUBCUTANEOUS at 15:47

## 2022-09-24 RX ADMIN — CEFTRIAXONE 100 MILLIGRAM(S): 500 INJECTION, POWDER, FOR SOLUTION INTRAMUSCULAR; INTRAVENOUS at 05:55

## 2022-09-24 RX ADMIN — Medication 1 DROP(S): at 17:20

## 2022-09-24 RX ADMIN — Medication 1 DROP(S): at 06:36

## 2022-09-24 RX ADMIN — Medication 40 MILLIGRAM(S): at 05:55

## 2022-09-24 RX ADMIN — Medication 40 MILLIEQUIVALENT(S): at 07:39

## 2022-09-24 RX ADMIN — Medication 40 MILLIEQUIVALENT(S): at 11:21

## 2022-09-24 RX ADMIN — Medication 40 MILLIGRAM(S): at 15:44

## 2022-09-24 RX ADMIN — Medication 20 MILLIEQUIVALENT(S): at 08:55

## 2022-09-24 NOTE — DISCHARGE NOTE PROVIDER - NSDCCPCAREPLAN_GEN_ALL_CORE_FT
PRINCIPAL DISCHARGE DIAGNOSIS  Diagnosis: Severe mitral regurgitation  Assessment and Plan of Treatment: You were found to have a severely leaky mitral valve that will likely require surgical intervention. You wished to be discharged and to follow up at [facility] or [place] for this surgical intervention.  If you experience any symptoms including but not limited to chest pain, shortness of breath, or dizziness, please call your doctor and seek immediate medical attention.  IT IS VERY IMPORTANT THAT YOU SEE DR. FLOYD GATES ON MONDAY MORNING 9/26/22 IN THE OFFICE FOR FOLLOW UP and to assist in arranging for your heart valve to be replaced.  Please resume your home Bumex 1 mg orally once daily. Please start metoprolol tartrate 12.5 mg twice daily. This was e-prescribed to your home pharmacy.         SECONDARY DISCHARGE DIAGNOSES  Diagnosis: Atrial flutter  Assessment and Plan of Treatment: You were found to be in atrial flutter which is a type of heart rhythm disorder in which the heart's upper chambers (atria) beat too quickly This causes the heart to beat in a fast, but usually regular, rhythm which can lead to blood clots, stroke, heart failure and other heart-related complications.  Please start metoprolol tartrate 12.5 mg twice daily to control your heart rhythm. Please start Eliquis 5 mg twice daily to decrease your risk of stroke (this will need to be transitioned to heparin or Lovenox OR held prior to any surgical intervention).    Diagnosis: Acute on chronic diastolic congestive heart failure  Assessment and Plan of Treatment: You have a history of weakened heart muscle called congestive heart failure which could be in the setting of your leaky mitral valve.   Please weigh yourself daily: if you have gained more than 2-3 lbs in one day or 5 lbs in one week contact your doctor immediately as you may be retaining water weight. In addition, restrict your salt intake to less than 2 grams a day. If you develop worsening shortening of breath, leg swelling, fatigue, chest pain, difficulty sleeping at night due to shortness of breath, contact your cardiologist immediately. It is very important that  you follow up with Dr. Floyd Gates on Monday for follow up.       Diagnosis: CAP (community acquired pneumonia)  Assessment and Plan of Treatment: You were suspected to have pneumonia. You were given two doses of Ceftrixone 2 gm IV. Please start Cefpodoxime 200 mg twice daily to complete 5 additional days of antibiotics. Please follow up with your Pulmonologist,     PRINCIPAL DISCHARGE DIAGNOSIS  Diagnosis: Severe mitral regurgitation  Assessment and Plan of Treatment: You were found to have a severely leaky mitral valve that will likely require surgical intervention. You wished to be discharged and to follow up at [facility] or [place] for this surgical intervention.  If you experience any symptoms including but not limited to chest pain, shortness of breath, or dizziness, please call your doctor and seek immediate medical attention.  IT IS VERY IMPORTANT THAT YOU SEE DR. HYLTON AND/OR FLOYD MILLER ON MONDAY 9/26/22 IN THE OFFICE FOR FOLLOW UP. Please follow up with Dr. Rowland at your scheduled appointment on Thursday 9/29/22. Also follow up with Dr. Arash Patterson ([place]) and schedule an appointment next week. Please resume your home Bumex 1 mg orally once daily. Please start metoprolol tartrate 12.5 mg twice daily. This was e-prescribed to your home pharmacy.         SECONDARY DISCHARGE DIAGNOSES  Diagnosis: Atrial flutter  Assessment and Plan of Treatment: You were found to be in atrial flutter which is a type of heart rhythm disorder in which the heart's upper chambers (atria) beat too quickly This causes the heart to beat in a fast, but usually regular, rhythm which can lead to blood clots, stroke, heart failure and other heart-related complications.  Please start metoprolol tartrate 12.5 mg twice daily to control your heart rhythm. Please start Eliquis 5 mg twice daily to decrease your risk of stroke (this will need to be transitioned to heparin or Lovenox OR held prior to any surgical intervention).    Diagnosis: Acute on chronic diastolic congestive heart failure  Assessment and Plan of Treatment: You have a history of weakened heart muscle called congestive heart failure which could be in the setting of your leaky mitral valve.   Please weigh yourself daily: if you have gained more than 2-3 lbs in one day or 5 lbs in one week contact your doctor immediately as you may be retaining water weight. In addition, restrict your salt intake to less than 2 grams a day. If you develop worsening shortening of breath, leg swelling, fatigue, chest pain, difficulty sleeping at night due to shortness of breath, contact your cardiologist immediately. It is very important that  you follow up with Dr. Hylton and/or Floyd Miller on Monday for follow up.       Diagnosis: CAP (community acquired pneumonia)  Assessment and Plan of Treatment: You were suspected to have pneumonia. You were given two doses of Ceftrixone 2 gm IV. Please start Cefpodoxime 200 mg twice daily to complete 5 additional days of antibiotics. Please follow up with your Pulmonologist.    Diagnosis: Suspected endocarditis  Assessment and Plan of Treatment: There was a finding on your echocardiogram suspicious for endocarditis but your blood cultures that were drawn on 9/23/22 did not grow any bacteria which makes this less likely. You also did not have a fever.  Endocarditis is a life-threatening inflammation of the inner lining of the heart's chambers and valves. This lining is called the endocardium. Endocarditis is usually caused by an infection. Bacteria, fungi or other germs get into the bloodstream and attach to damaged areas in the heart. Ultimately, we would like to wait some additional days to be sure that bacteria does not grow but you did not wish to wait. If you develop a fever, please seek medical attention immediately. Please follow up with Dr. Floyd Miller who can contact our cardiac telemetry unit at [phone] on Monday  to make sure that there is still no growth on the blood cultures.

## 2022-09-24 NOTE — PROGRESS NOTE ADULT - PROBLEM SELECTOR PLAN 1
-bibasilar crackles, mild JVD, trace b/l LE edema  - Outpatient echo 09/22: EF 57%, mild concentric LVH, severe eccentric posteriorly directed MR possible flail leaflet mitral anterior leaflet, moderate TR, mild pulm HTN  -continue diuresis with Lasix 40 mg IV BID   - core measures: strict I/Os and daily standing weights, fluid restriction <1.2L daily Decreased BS to RLL, no JVD, no pedal edema, net negative 1.85 L x 24 hours this AM  - o/p Echo 09/22: EF 57%, mild concentric LVH, severe eccentric posteriorly directed MR possible flail leaflet mitral anterior leaflet, moderate TR, mild pulm HTN  - PREETI 9/24/22: LVEF 55%, mildly dilated right ventricular size, borderline reduced RVSF,  anterior leaflet of the MV severely thickened (A2 scallop); A2 scallop is flail; torn chordae tendinae seen in the LA side of the valve, attached to the A2 scallop; cannot entirely rule out a concomitant vegetation within the thickening of the A2 scallop; mean transvalvular gradient 2.00 mmHg at a HR 75 bpm, mitral valve area estimated 5.70 cm²; severe, eccentric, posteriorly directed MR; moderate TR,  mild pulm hypertension (PASP 40 mmHg), moderate non-mobile plaque seen in the visualized portion of the descending aorta;  mild non-mobile plaque seen in the visualized portion of the aortic arch.  - c/w Lasix 40 mg IV BID. Plan to initiate BB when euvolemic.   - core measures: strict I/Os and daily standing weights, fluid restriction <1.2L daily

## 2022-09-24 NOTE — DISCHARGE NOTE NURSING/CASE MANAGEMENT/SOCIAL WORK - NSDCPEFALRISK_GEN_ALL_CORE
For information on Fall & Injury Prevention, visit: [email or web address] OR  [email or web address] OR  https://www.cdc.gov/steadi/patient.html

## 2022-09-24 NOTE — DISCHARGE NOTE NURSING/CASE MANAGEMENT/SOCIAL WORK - PATIENT PORTAL LINK FT
You can access the FollowMyHealth Patient Portal offered by [facility] by registering at the following website: [email or web address]. By joining [facility]’s FollowMyHealth portal, you will also be able to view your health information using other applications (apps) compatible with our system.

## 2022-09-24 NOTE — PROGRESS NOTE ADULT - PROBLEM SELECTOR PLAN 5
- HOME Meds: Trelegy Ellipta  - continue with therapeutic interchange: Spiriva/Symbicort      DVT PPx: Heparin gtt  Code Status: FULL  Dispo: pending clinical progression  PT consult ordered    Case d/w Dr. Anderson Does not appear to be in acute exacerbation. No wheezing on exam. Saturating well on RA.   - HOME Meds: Trelegy Ellipta. c/w TIC Spiriva/Symbicort    DVT PPx: Heparin gtt  Code Status: FULL  Dispo: pending clinical progression, PT recommending home with no skilled PT needs    Case d/w Dr. Alexis, CTS PA

## 2022-09-24 NOTE — PROGRESS NOTE ADULT - PROBLEM SELECTOR PLAN 3
-Rate controlled a-flutter, HR 90s  - TFTs WNL   -continue heparin gtt Rate controlled a-flutter, avg VR 90s. Also with NSVT x4, occasional couplets on tele  9/23-9/24/22.  - TFTs WNL   - Plan to start BB when nearing euvolemia.   - Continue heparin gtt with plan to transition to Eliquis post surgical intervention.

## 2022-09-24 NOTE — DISCHARGE NOTE PROVIDER - NSDCMRMEDTOKEN_GEN_ALL_CORE_FT
bumetanide 1 mg oral tablet: 1 tab(s) orally once a day  latanoprost 0.005% ophthalmic solution: 1 drop(s) to each affected eye once a day (in the evening)  timolol hemihydrate 0.5% ophthalmic solution: 1 drop(s) to each affected eye 2 times a day  Trelegy Ellipta 100 mcg-62.5 mcg-25 mcg/inh inhalation powder: 1 puff(s) inhaled once a day   apixaban 5 mg oral tablet: 1 tab(s) orally every 12 hours  bumetanide 1 mg oral tablet: 1 tab(s) orally once a day  cefpodoxime 200 mg oral tablet: 1 tab(s) orally 2 times a day   latanoprost 0.005% ophthalmic solution: 1 drop(s) to each affected eye once a day (in the evening)  Metoprolol Tartrate 25 mg oral tablet: 0.5 tab(s) orally 2 times a day   timolol hemihydrate 0.5% ophthalmic solution: 1 drop(s) to each affected eye 2 times a day  Trelegy Ellipta 100 mcg-62.5 mcg-25 mcg/inh inhalation powder: 1 puff(s) inhaled once a day

## 2022-09-24 NOTE — PROGRESS NOTE ADULT - ASSESSMENT
78 y/o Estonian/English speaking M, former smoker (quit 40 yrs ago), PMHx of HTN, COPD/emphysema, lung ca s/p RUL lobectomy (02/2021 @ [facility]), prostate CA s/p prostatectomy (03/2015 @ [facility]) who was sent in from Cardiologist Dr. Miller c/o ARNOLD x2 weeks & bilateral LE edema, underwent PREETI and found to have severe MR with flail A2 scallop  & torn chordae tendinae and unable to r/o concomitant vegetation, BCx  78 y/o Yakut/English speaking M, former smoker (quit 40 yrs ago), PMHx of HTN, COPD/emphysema, frequent episodes CAP, lung ca s/p RUL lobectomy (02/2021 @ [facility]), prostate CA s/p prostatectomy (03/2015 @ [facility]) who was sent in from Cardiologist Dr. Miller c/o ARNOLD x2 weeks & bilateral LE edema, underwent PREETI and found to have severe MR with flail A2 scallop  & torn chordae tendinae and unable to r/o concomitant vegetation, BCx no growth x 1 day, awaiting CTS recommendations. Also found to be in new-onset Aflutter and acute HFpEF exacerbation 2/2 to valvular disease, heparin gtt initiated with eventual plan to transition to Eliquis, remains in rate controlled Aflutter, s/p IV diuresis and nearing euvolemia. Hospital course further c/b presumed CAP with leukocytosis peak 15.93 9/23, started on 2 gm IV Ceftriaxone on 9/23 to complete 7 day course,  awaiting CT chest. Plan for transfer to OSH ([facility] vs. [facility] ) vs d/c with close follow up if medically cleared.

## 2022-09-24 NOTE — PROGRESS NOTE ADULT - PROBLEM SELECTOR PLAN 4
WBC: 12.32 on admission, this AM 15.95 afebrile and asymptomatic  - c/w IV Ceftriaxone 2G daily as discussed with cardiology fellow, Incentive spirometry  -UA and COVID negative   -CXR 09/22 Cardiomegaly, thoracic aortic calcification. Right basilar focal atelectasis, costophrenic angle pleural thickening, elevation of the right hemidiaphragm. Right hilar surgical clips.. Right seventh rib partial resection.. Thoracic spine degenerative changes  -f/u blood cultures WBC: 12.32>15.95> 14.63, afebrile, asymptomatic  - UA 9/22 unremarkable,  COVID negative. f/u AM procal  - PREETI 9/23/22: cannot entirely rule out a concomitant vegetation within the thickening of the A2 scallop, BCx NTD.  - CXR 09/22: R basilar focal atelectasis, costophrenic angle pleural thickening, elevation of the R hemidiaphragm, R hilar surgical clips, R seventh rib partial resection.  - f/u CT chest NC  - c/w IV Ceftriaxone 2G daily to complete 7 day course for presumed CAP.  - Repeat surveillance cultures if becomes febrile.

## 2022-09-24 NOTE — DISCHARGE NOTE NURSING/CASE MANAGEMENT/SOCIAL WORK - NSDCPEELIQUISDIET_GEN_ALL_CORE
Eat healthy foods you enjoy. Apixaban/Eliquis DOES NOT have a special diet. Limit your alcohol intake. 2

## 2022-09-24 NOTE — DISCHARGE NOTE PROVIDER - PROVIDER TOKENS
FREE:[LAST:[Tung],FIRST:[Dr. Barrientos],PHONE:[phone]],FAX:[(   )    -],ADDRESS:[Cardiologist  [address] [place] [address] [address] [place] [address]]] FREE:[LAST:[Tung],FIRST:[Dr. Barrientos],PHONE:[phone]],FAX:[(   )    -],ADDRESS:[Cardiologist  [address] [place] [address]]],PROVIDER:[TOKEN:[[id number]]],PROVIDER:[TOKEN:[[id number]]]

## 2022-09-24 NOTE — DISCHARGE NOTE PROVIDER - CARE PROVIDER_API CALL
Dr. Floyd Miller  Cardiologist  [address] [place] [address] [address], [place] [address]  Phone: [phone]  Fax: (   )    -  Follow Up Time:    Dr. Floyd Milelr  Cardiologist  [address], [place] [address]  Phone: [phone]  Fax: (   )    -  Follow Up Time:     TERENCE BARR  Thoracic Surgery (Cardiothoracic Vascular Surgery)  [address] [place]  [place] [address]  Phone: ()-  Fax: ()-  Follow Up Time:     FAVIO HYLTON  Internal Medicine  [address] [place] [address]  Phone: ()-  Fax: ()-  Follow Up Time:

## 2022-09-24 NOTE — PROGRESS NOTE ADULT - NUTRITIONAL ASSESSMENT
LVEF 55%, mildly dilated right ventricular size, borderline reduced RVSF,  anterior leaflet of the MV severely thickened (A2 scallop); A2 scallop is flail; torn chordae tendinae seen   in the LA side of the valve, attached to the A2 scallop; cannot entirely rule out a concomitant vegetation within the thickening of the A2 scallop; mean transvalvular gradient 2.00 mmHg at a HR 75 bpm, mitral valve area estimated 5.70 cm²; severe, eccentric, posteriorly directed MR; moderate TR,  mild pulm hypertension (PASP 40 mmHg), moderate non-mobile plaque seen in the visualized portion of the descending aorta;  mild non-mobile plaque seen in the visualized portion of the aortic arch.

## 2022-09-24 NOTE — PROGRESS NOTE ADULT - PROBLEM SELECTOR PLAN 2
-Echo as above  -CTS consulted f/u recs  -NPO for PREETI today - Echo as above  - CTS consulted, possible plan surgical eval. Dr. Corrales to evaluate 9/25 afternoon

## 2022-09-24 NOTE — DISCHARGE NOTE PROVIDER - HOSPITAL COURSE
78 y/o Estonian speaking male, former smoker (quit 40 yrs ago) with PMHx of HTN, lung ca s/p RUL lobectomy (02/2021 @ [facility]), COPD, emphysema, prostate CA s/p prostatectomy (03/2015 @ [facility]) who presents to [facility] ED with c/o shortness of breath on exertion x2 weeks and bilateral LE edema, sent in from primary care/cardiologist's office.  Pt's leg swelling slightly subsided for the past 4 days since he stopped Norvasc and started Bumetanide, but he still feels lousy, generally weak/fatigue and has frequent shortness of breath. He denies fever, chills, cough, abdominal pain, chest pain, or any other acute complaints. Per pt's son, his doctor did EKG in office today and was concerned it was abnormal. Doctor recommended pt come to [facility] ED for cardiac admission for further treatment. Upon arrival to the ED patient's workup revealed: T98.4, /71, HR85, RR18, SpO2 99% on RA. Labs significant for WBC 12.32, H/H 11.6/36.1, AST/ALT 42/64, Troponin neg x1, BNP 4187, UA neg, COVID PCR neg. ECG atrial flutter 90 bpm. CXR possible bullae in RLL (follow up official read). Patient t/w Azithromycin 500 mg IV x1, Ceftriaxone 1G IV x1 for leukocytosis, and Lasix 40 mg IV x1 in the ED. Underwent PREETI and found to have severe MR with flail A2 scallop  & torn chordae tendinae and unable to r/o concomitant vegetation, BCx no growth x 1 day.  Also found to be in new-onset Aflutter and acute HFpEF exacerbation 2/2 to valvular disease, heparin gtt initiated and transitioned to Eliquis, remained in rate controlled Aflutter, s/p IV diuresis which was transitioned to PO. Hospital course further c/b presumed CAP with leukocytosis peak 15.93 9/23, started on 2 gm IV Ceftriaxone on 9/23 which was transitioned to PO Cefpodoxime to complete 7 day course.   Acute on chronic heart failure with preserved ejection fraction (HFpEF) 2/2 valvular disease. o/p Echo 09/22: EF 57%, mild concentric LVH, severe eccentric posteriorly directed MR possible flail leaflet mitral anterior leaflet, moderate TR, mild pulm HTN. PREETI 9/24/22: LVEF 55%, mildly dilated right ventricular size, borderline reduced RVSF,  anterior leaflet of the MV severely thickened (A2 scallop); A2 scallop is flail; torn chordae tendinae seen in the LA side of the valve, attached to the A2 scallop; cannot entirely rule out a concomitant vegetation within the thickening of the A2 scallop; mean transvalvular gradient 2.00 mmHg at a HR 75 bpm, mitral valve area estimated 5.70 cm²; severe, eccentric, posteriorly directed MR; moderate TR,  mild pulm hypertension (PASP 40 mmHg), moderate non-mobile plaque seen in the visualized portion of the descending aorta;  mild non-mobile plaque seen in the visualized portion of the aortic arch.  s/p Lasix 40 mg IV BID which was transitioned back to home Bumex 1 mg PO QD on discharge. Initiated Lopressor 12.5 mg PO BID. Dr. Corrales CTS evaluated on 9/23/22. In-depth discussion completed with son, Dr. Alexis with PMD on conference. Patient/son would like to be discharged and to pursue CTS w/u at either [place] vs. [facility] on Monday 9/26/22 (as previous lobectomy completed there). Currently appears hemodynamically stable, euvolemic on exam. As per son, Dr. Corrales CTS evaluated on 9/23/22 who was in agreement with this plan. Dr. Corrales unable to be reached on 9/24/22 to confirm this plan.  PMD and Dr. Wilson also in agreement with this plan.  Patient and son adamantly agree that they will follow up with Dr. Wilson on Monday 9/26/22 AM.     Found to be in rate controlled a-flutter, avg VR 90s. TFTs WNL. Heparin gtt transitioned to Eliquis 2.5 mg PO BID.  Started on Lopressor 12.5 mg PO BID. Patient to follow up with Dr. Wilson on Monday 9/26/22 AM.     Patient with leukocytosis 12.32>15.95> 14.63, afebrile, asymptomatic. UA 9/22 unremarkable,  COVID negative. f/u AM procal/ PREETI 9/23/22: cannot entirely rule out a concomitant vegetation within the thickening of the A2 scallop, BCx NTD.  CXR 09/22: R basilar focal atelectasis, costophrenic angle pleural thickening, elevation of the R hemidiaphragm, R hilar surgical clips, R seventh rib partial resection. Started on IV Ceftriaxone 2G daily to complete 7 day course for presumed CAP (received 2 doses thus far). To be transitioned to Cefpodoxime 200 mg PO BID for 5 additional days.     PMHx COPD (chronic obstructive pulmonary disease). Did not appear to be in acute exacerbation. No wheezing on exam. Saturating well on RA. Patient to resume home Trele Ellipta on discharge.      Patient has been medically cleared for discharge as per Dr. Alexis. Patient has been given appropriate discharge instructions including medication regimen, access site management and follow up. Medications that patient needs refills on have been e-prescribed to preferred pharmacy.     Temp 96.9F, HR 98, /72, RR 18, SpO2 97% on RA.  Gen: NAD, A&O x3  Cards: Irregularly irregular, JESSE ascultated best at mitral area, III/XI  Pulm: CTA B/L without w/r/r  Abd: soft, NT, BS + x4  Ext: no LE edema or ulcerations B/L        Dx: Heart Failure, Unstable Angina/ACS/NSTEMI/STEMI this Admit or History of Heart Failure, : YES     Office closed Saturday. Patient close friend of Dr. wilson and will follow up Monday.            Cardiac Rehab Indications (STEMI/NSTEMI/ACS/Unstable Angina/CHF/Chronic Stable Angina/Heart Surgery (CABG,Valve)/Post PCI):   No         *Referral and Prescription Given for Cardiac Rehab: No    (Reasons for No Cardiac Rehab Referral Rx - must document 1 or more options):   Awaiting MVR    CHF: Beta Blocker Prescribed: Yes           ACE-I/ARB/Entresto Prescribed: EF preserved       78 y/o Hungarian speaking male, former smoker (quit 40 yrs ago) with PMHx of HTN, lung ca s/p RUL lobectomy (02/2021 @ [facility]), COPD, emphysema, prostate CA s/p prostatectomy (03/2015 @ [facility]) who presents to [facility] ED with c/o shortness of breath on exertion x2 weeks and bilateral LE edema, sent in from primary care/cardiologist's office.  Pt's leg swelling slightly subsided for the past 4 days since he stopped Norvasc and started Bumetanide, but he still feels lousy, generally weak/fatigue and has frequent shortness of breath. He denies fever, chills, cough, abdominal pain, chest pain, or any other acute complaints. Per pt's son, his doctor did EKG in office today and was concerned it was abnormal. Doctor recommended pt come to [facility] ED for cardiac admission for further treatment. Upon arrival to the ED patient's workup revealed: T98.4, /71, HR85, RR18, SpO2 99% on RA. Labs significant for WBC 12.32, H/H 11.6/36.1, AST/ALT 42/64, Troponin neg x1, BNP 4187, UA neg, COVID PCR neg. ECG atrial flutter 90 bpm. CXR possible bullae in RLL (follow up official read). Patient t/w Azithromycin 500 mg IV x1, Ceftriaxone 1G IV x1 for leukocytosis, and Lasix 40 mg IV x1 in the ED. Underwent PREETI and found to have severe MR with flail A2 scallop  & torn chordae tendinae and unable to r/o concomitant vegetation, BCx no growth x 1 day.  Also found to be in new-onset Aflutter and acute HFpEF exacerbation 2/2 to valvular disease, heparin gtt initiated and transitioned to Eliquis, remained in rate controlled Aflutter, s/p IV diuresis which was transitioned to PO. Hospital course further c/b presumed CAP with leukocytosis peak 15.93 9/23, started on 2 gm IV Ceftriaxone on 9/23 which was transitioned to PO Cefpodoxime to complete 7 day course.   Acute on chronic heart failure with preserved ejection fraction (HFpEF) 2/2 valvular disease. o/p Echo 09/22: EF 57%, mild concentric LVH, severe eccentric posteriorly directed MR possible flail leaflet mitral anterior leaflet, moderate TR, mild pulm HTN. PREETI 9/24/22: LVEF 55%, mildly dilated right ventricular size, borderline reduced RVSF,  anterior leaflet of the MV severely thickened (A2 scallop); A2 scallop is flail; torn chordae tendinae seen in the LA side of the valve, attached to the A2 scallop; cannot entirely rule out a concomitant vegetation within the thickening of the A2 scallop; mean transvalvular gradient 2.00 mmHg at a HR 75 bpm, mitral valve area estimated 5.70 cm²; severe, eccentric, posteriorly directed MR; moderate TR,  mild pulm hypertension (PASP 40 mmHg), moderate non-mobile plaque seen in the visualized portion of the descending aorta;  mild non-mobile plaque seen in the visualized portion of the aortic arch.  s/p Lasix 40 mg IV BID which was transitioned back to home Bumex 1 mg PO QD on discharge. Initiated Lopressor 12.5 mg PO BID. Dr. Antoni BELL evaluated on 9/23/22. In-depth discussion completed with son, Dr. Alexis with PMD Dr. Melissa Cedeño on conference. Patient/son would like to be discharged and to pursue CTS w/u at either [place] vs. [facility]  (as previous lobectomy completed there).  Has scheduled appointment with Dr. Kashif BELL at [facility] on Thursday 9/29/22. Son is also working on arranging appointment with Dr. Arash Patterson CTS at [place]  this week as well. Currently appears hemodynamically stable, euvolemic on exam. As per son, Dr. Antoni BELL evaluated on 9/23/22 who was in agreement with this plan. Dr. Corrales unable to be reached on 9/24/22 to confirm this plan.  PMD Dr. Cedeño in agreement with this plan.  Patient and son adamantly agree that they will follow up with Dr. Melissa Cedeño and/or Dr. Miller on Monday 9/26/22. .     Found to be in rate controlled a-flutter, avg VR 90s. TFTs WNL. Heparin gtt transitioned to Eliquis 5 mg PO BID.  Started on Lopressor 12.5 mg PO BID. Patient to follow up with Dr. Miller on Monday 9/26/22 AM.     Patient with leukocytosis 12.32>15.95> 14.63, afebrile, asymptomatic. UA 9/22 unremarkable,  COVID negative. f/u AM procal/ PREETI 9/23/22: cannot entirely rule out a concomitant vegetation within the thickening of the A2 scallop, BCx NTD.  CXR 09/22: R basilar focal atelectasis, costophrenic angle pleural thickening, elevation of the R hemidiaphragm, R hilar surgical clips, R seventh rib partial resection. Started on IV Ceftriaxone 2G daily to complete 7 day course for presumed CAP (received 2 doses thus far). To be transitioned to Cefpodoxime 200 mg PO BID for 5 additional days.     PMHx COPD (chronic obstructive pulmonary disease). Did not appear to be in acute exacerbation. No wheezing on exam. Saturating well on RA. Patient to resume home Trelegy Ellipta on discharge.      Patient has been medically cleared for discharge as per Dr. Alexis. Patient has been given appropriate discharge instructions including medication regimen, access site management and follow up. Medications that patient needs refills on have been e-prescribed to preferred pharmacy.     Temp 96.9F, HR 98, /72, RR 18, SpO2 97% on RA.  Gen: NAD, A&O x3  Cards: Irregularly irregular, JESSE ascultated best at mitral area, III/XI  Pulm: CTA B/L without w/r/r  Abd: soft, NT, BS + x4  Ext: no LE edema or ulcerations B/L        Dx: Heart Failure, Unstable Angina/ACS/NSTEMI/STEMI this Admit or History of Heart Failure, : YES     Office closed Saturday. Has appointment with CTS Dr. Rowland on Thursday 9/28/22.            Cardiac Rehab Indications (STEMI/NSTEMI/ACS/Unstable Angina/CHF/Chronic Stable Angina/Heart Surgery (CABG,Valve)/Post PCI):   No         *Referral and Prescription Given for Cardiac Rehab: No    (Reasons for No Cardiac Rehab Referral Rx - must document 1 or more options):   Awaiting MVR    CHF: Beta Blocker Prescribed: Yes           ACE-I/ARB/Entresto Prescribed: EF preserved       76 y/o Portuguese speaking male, former smoker (quit 40 yrs ago) with PMHx of HTN, lung ca s/p RUL lobectomy (02/2021 @ [facility]), COPD, emphysema, prostate CA s/p prostatectomy (03/2015 @ [facility]) who presents to [facility] ED with c/o shortness of breath on exertion x2 weeks and bilateral LE edema, sent in from primary care/cardiologist's office.  Pt's leg swelling slightly subsided for the past 4 days since he stopped Norvasc and started Bumetanide, but he still feels lousy, generally weak/fatigue and has frequent shortness of breath. He denies fever, chills, cough, abdominal pain, chest pain, or any other acute complaints. Per pt's son, his doctor did EKG in office today and was concerned it was abnormal. Doctor recommended pt come to [facility] ED for cardiac admission for further treatment. Upon arrival to the ED patient's workup revealed: T98.4, /71, HR85, RR18, SpO2 99% on RA. Labs significant for WBC 12.32, H/H 11.6/36.1, AST/ALT 42/64, Troponin neg x1, BNP 4187, UA neg, COVID PCR neg. ECG atrial flutter 90 bpm. CXR possible bullae in RLL (follow up official read). Patient t/w Azithromycin 500 mg IV x1, Ceftriaxone 1G IV x1 for leukocytosis, and Lasix 40 mg IV x1 in the ED. Underwent PREETI and found to have severe MR with flail A2 scallop  & torn chordae tendinae and unable to r/o concomitant vegetation, BCx no growth x 1 day.  Also found to be in new-onset Aflutter and acute HFpEF exacerbation 2/2 to valvular disease, heparin gtt initiated and transitioned to Eliquis, remained in rate controlled Aflutter, s/p IV diuresis which was transitioned to PO. Hospital course further c/b presumed CAP with leukocytosis peak 15.93 9/23, started on 2 gm IV Ceftriaxone on 9/23 which was transitioned to PO Cefpodoxime to complete 7 day course.   Acute on chronic heart failure with preserved ejection fraction (HFpEF) 2/2 valvular disease. o/p Echo 09/22: EF 57%, mild concentric LVH, severe eccentric posteriorly directed MR possible flail leaflet mitral anterior leaflet, moderate TR, mild pulm HTN. PREETI 9/24/22: LVEF 55%, mildly dilated right ventricular size, borderline reduced RVSF,  anterior leaflet of the MV severely thickened (A2 scallop); A2 scallop is flail; torn chordae tendinae seen in the LA side of the valve, attached to the A2 scallop; cannot entirely rule out a concomitant vegetation within the thickening of the A2 scallop; mean transvalvular gradient 2.00 mmHg at a HR 75 bpm, mitral valve area estimated 5.70 cm²; severe, eccentric, posteriorly directed MR; moderate TR,  mild pulm hypertension (PASP 40 mmHg), moderate non-mobile plaque seen in the visualized portion of the descending aorta;  mild non-mobile plaque seen in the visualized portion of the aortic arch.  s/p Lasix 40 mg IV BID which was transitioned back to home Bumex 1 mg PO QD on discharge. Initiated Lopressor 12.5 mg PO BID. Dr. Antoni BELL evaluated on 9/23/22. In-depth discussion completed with son, Dr. Alexis with PMD Dr. Melissa Cedeño on conference. Patient/son would like to be discharged and to pursue CTS w/u at either [place] vs. [facility]  (as previous lobectomy completed there).  Has scheduled appointment with Dr. Kashif BELL at [facility] on Thursday 9/29/22. Son is also working on arranging appointment with Dr. Arash Patterson CTS at [place]  this week as well. Currently appears hemodynamically stable, euvolemic on exam. As per son, Dr. Antoni BELL evaluated on 9/23/22 who was in agreement with this plan. Dr. Corrales unable to be reached on 9/24/22 to confirm this plan.  PMD Dr. Cedeño in agreement with this plan.  Patient and son adamantly agree that they will follow up with Dr. Melissa Cedeño and/or Dr. Miller on Monday 9/26/22.  Patient's son Alan states that will be staying with patient and caring for him until his heart valve is fixed.    Found to be in rate controlled a-flutter, avg VR 90s. TFTs WNL. Heparin gtt transitioned to Eliquis 5 mg PO BID.  Started on Lopressor 12.5 mg PO BID. Patient to follow up with Dr. Miller on Monday 9/26/22 AM.     Patient with leukocytosis 12.32>15.95> 14.63, afebrile, asymptomatic. UA 9/22 unremarkable,  COVID negative. f/u AM procal/ PREETI 9/23/22: cannot entirely rule out a concomitant vegetation within the thickening of the A2 scallop, BCx NTD.  CXR 09/22: R basilar focal atelectasis, costophrenic angle pleural thickening, elevation of the R hemidiaphragm, R hilar surgical clips, R seventh rib partial resection. Started on IV Ceftriaxone 2G daily to complete 7 day course for presumed CAP (received 2 doses thus far). To be transitioned to Cefpodoxime 200 mg PO BID for 5 additional days.     PMHx COPD (chronic obstructive pulmonary disease). Did not appear to be in acute exacerbation. No wheezing on exam. Saturating well on RA. Patient to resume home [facility] on discharge.      Patient has been medically cleared for discharge as per Dr. Alexis. Patient has been given appropriate discharge instructions including medication regimen, access site management and follow up. Medications that patient needs refills on have been e-prescribed to preferred pharmacy.     Temp 96.9F, HR 98, /72, RR 18, SpO2 97% on RA.  Gen: NAD, A&O x3  Cards: Irregularly irregular, JESSE ascultated best at mitral area, III/XI  Pulm: CTA B/L without w/r/r  Abd: soft, NT, BS + x4  Ext: no LE edema or ulcerations B/L        Dx: Heart Failure, Unstable Angina/ACS/NSTEMI/STEMI this Admit or History of Heart Failure, : YES     Office closed Saturday. Has appointment with CTS Dr. Rowland on Thursday 9/28/22.            Cardiac Rehab Indications (STEMI/NSTEMI/ACS/Unstable Angina/CHF/Chronic Stable Angina/Heart Surgery (CABG,Valve)/Post PCI):   No         *Referral and Prescription Given for Cardiac Rehab: No    (Reasons for No Cardiac Rehab Referral Rx - must document 1 or more options):   Awaiting MVR    CHF: Beta Blocker Prescribed: Yes           ACE-I/ARB/Entresto Prescribed: EF preserved

## 2022-09-24 NOTE — PROGRESS NOTE ADULT - SUBJECTIVE AND OBJECTIVE BOX
Interventional Cardiology PA Adult Progress Note    Subjective Assessment:  	  MEDICATIONS:  furosemide   Injectable 40 milliGRAM(s) IV Push two times a day    cefTRIAXone   IVPB 2000 milliGRAM(s) IV Intermittent <User Schedule>    budesonide  80 MICROgram(s)/formoterol 4.5 MICROgram(s) Inhaler 2 Puff(s) Inhalation two times a day  tiotropium 18 MICROgram(s) Capsule 1 Capsule(s) Inhalation daily          heparin   Injectable 5000 Unit(s) IV Push every 6 hours PRN  heparin   Injectable 2500 Unit(s) IV Push every 6 hours PRN  heparin  Infusion.  Unit(s)/Hr IV Continuous <Continuous>  latanoprost 0.005% Ophthalmic Solution 1 Drop(s) Both EYES at bedtime  timolol 0.5% Solution 1 Drop(s) Both EYES two times a day      	    [PHYSICAL EXAM:  TELEMETRY:  T(C): 36.8 (09-24-22 @ 14:11), Max: 37.1 (09-23-22 @ 18:30)  HR: 98 (09-24-22 @ 09:00) (76 - 98)  BP: 108/72 (09-24-22 @ 09:00) (93/66 - 114/71)  RR: 18 (09-24-22 @ 09:00) (17 - 18)  SpO2: 97% (09-24-22 @ 09:00) (93% - 97%)  Wt(kg): --  I&O's Summary    23 Sep 2022 07:01  -  24 Sep 2022 07:00  --------------------------------------------------------  IN: 403 mL / OUT: 2250 mL / NET: -1847 mL    24 Sep 2022 07:01  -  24 Sep 2022 15:24  --------------------------------------------------------  IN: 740 mL / OUT: 0 mL / NET: 740 mL        Loo:  Central/PICC/Mid Line:                                         Appearance: Normal	  HEENT:   Normal oral mucosa, PERRL, EOMI	  Neck: Supple, + JVD/ - JVD; Carotid Bruit   Cardiovascular: Normal S1 S2, No JVD, No murmurs,   Respiratory: Lungs clear to auscultation/Decreased Breath Sounds/No Rales, Rhonchi, Wheezing	  Gastrointestinal:  Soft, Non-tender, + BS	  Skin: No rashes, No ecchymoses, No cyanosis  Extremities: Normal range of motion, No clubbing, cyanosis or edema  Vascular: Peripheral pulses palpable 2+ bilaterally  Neurologic: Non-focal  Psychiatry: A & O x 3, Mood & affect appropriate      	    ECG:  	  RADIOLOGY:   DIAGNOSTIC TESTING:  [ ] Echocardiogram:  [ ]  Catheterization:  [ ] Stress Test:    [ ] PREETI  OTHER: 	    LABS:	 	  CARDIAC MARKERS:                                  11.4   14.63 )-----------( 389      ( 24 Sep 2022 06:20 )             36.5     09-24    141  |  99  |  22  ----------------------------<  131<H>  2.9<LL>   |  28  |  0.80    Ca    9.0      24 Sep 2022 06:20  Mg     2.1     09-24    TPro  6.4  /  Alb  3.5  /  TBili  1.1  /  DBili  x   /  AST  42<H>  /  ALT  64<H>  /  AlkPhos  97  09-22    proBNP:   Lipid Profile:   HgA1c:   TSH:   PT/INR - ( 22 Sep 2022 20:11 )   PT: 13.8 sec;   INR: 1.16          PTT - ( 24 Sep 2022 03:50 )  PTT:59.4 sec    ASSESSMENT/PLAN: 	        DVT ppx:  Dispo:     Interventional Cardiology PA Adult Progress Note    Subjective Assessment:  Patient seen and evaluated at bedside this AM with son present.  States he has been feeling much better today. SOB improved. LE edema resolved. Has been losing weight despite gaining water weight PTA. Denies CP, dizziness, palpitations, N/V. All other ROS otherwise negative except per subjective.   	  MEDICATIONS:  furosemide   Injectable 40 milliGRAM(s) IV Push two times a day  cefTRIAXone   IVPB 2000 milliGRAM(s) IV Intermittent <User Schedule>  budesonide  80 MICROgram(s)/formoterol 4.5 MICROgram(s) Inhaler 2 Puff(s) Inhalation two times a day  tiotropium 18 MICROgram(s) Capsule 1 Capsule(s) Inhalation daily  heparin   Injectable 5000 Unit(s) IV Push every 6 hours PRN  heparin   Injectable 2500 Unit(s) IV Push every 6 hours PRN  heparin  Infusion.  Unit(s)/Hr IV Continuous <Continuous>  latanoprost 0.005% Ophthalmic Solution 1 Drop(s) Both EYES at bedtime  timolol 0.5% Solution 1 Drop(s) Both EYES two times a day      [PHYSICAL EXAM:  TELEMETRY:  T(C): 36.8 (09-24-22 @ 14:11), Max: 37.1 (09-23-22 @ 18:30)  HR: 98 (09-24-22 @ 09:00) (76 - 98)  BP: 108/72 (09-24-22 @ 09:00) (93/66 - 114/71)  RR: 18 (09-24-22 @ 09:00) (17 - 18)  SpO2: 97% (09-24-22 @ 09:00) (93% - 97%)  Wt(kg): --  I&O's Summary    23 Sep 2022 07:01  -  24 Sep 2022 07:00  --------------------------------------------------------  IN: 403 mL / OUT: 2250 mL / NET: -1847 mL    24 Sep 2022 07:01  -  24 Sep 2022 15:24  --------------------------------------------------------  IN: 740 mL / OUT: 0 mL / NET: 740 mL    Appearance: Elderly male laying in hospital bed in NAD	  HEENT:   EOMI	  Neck:  - JVD  Cardiovascular: Irregularly irregular, +JESSE auscultated best at LSB/apical  Respiratory: Decreased BS to RLL, no w/r/r  Gastrointestinal:  Soft, Non-tender, + BS	x4  Skin: No rashes, No ecchymoses, No cyanosis  Extremities: No pedal edema B/L  Neurologic: Non-focal  Psychiatry: A & O x 3, Mood & affect appropriate                          11.4   14.63 )-----------( 389      ( 24 Sep 2022 06:20 )             36.5     09-24    141  |  99  |  22  ----------------------------<  131<H>  2.9<LL>   |  28  |  0.80    Ca    9.0      24 Sep 2022 06:20  Mg     2.1     09-24    TPro  6.4  /  Alb  3.5  /  TBili  1.1  /  DBili  x   /  AST  42<H>  /  ALT  64<H>  /  AlkPhos  97  09-22  PT/INR - ( 22 Sep 2022 20:11 )   PT: 13.8 sec;   INR: 1.16     PTT - ( 24 Sep 2022 03:50 )  PTT:59.4 sec

## 2022-09-28 LAB
CULTURE RESULTS: SIGNIFICANT CHANGE UP
CULTURE RESULTS: SIGNIFICANT CHANGE UP
SPECIMEN SOURCE: SIGNIFICANT CHANGE UP
SPECIMEN SOURCE: SIGNIFICANT CHANGE UP

## 2022-09-30 DIAGNOSIS — J43.9 EMPHYSEMA, UNSPECIFIED: ICD-10-CM

## 2022-09-30 DIAGNOSIS — I50.9 HEART FAILURE, UNSPECIFIED: ICD-10-CM

## 2022-09-30 DIAGNOSIS — D72.829 ELEVATED WHITE BLOOD CELL COUNT, UNSPECIFIED: ICD-10-CM

## 2022-09-30 DIAGNOSIS — I08.1 RHEUMATIC DISORDERS OF BOTH MITRAL AND TRICUSPID VALVES: ICD-10-CM

## 2022-09-30 DIAGNOSIS — I11.0 HYPERTENSIVE HEART DISEASE WITH HEART FAILURE: ICD-10-CM

## 2022-09-30 DIAGNOSIS — I50.33 ACUTE ON CHRONIC DIASTOLIC (CONGESTIVE) HEART FAILURE: ICD-10-CM

## 2022-09-30 DIAGNOSIS — Z85.46 PERSONAL HISTORY OF MALIGNANT NEOPLASM OF PROSTATE: ICD-10-CM

## 2022-09-30 DIAGNOSIS — I48.92 UNSPECIFIED ATRIAL FLUTTER: ICD-10-CM

## 2022-09-30 DIAGNOSIS — I27.20 PULMONARY HYPERTENSION, UNSPECIFIED: ICD-10-CM

## 2022-09-30 DIAGNOSIS — Z88.8 ALLERGY STATUS TO OTHER DRUGS, MEDICAMENTS AND BIOLOGICAL SUBSTANCES STATUS: ICD-10-CM

## 2022-09-30 DIAGNOSIS — I45.9 CONDUCTION DISORDER, UNSPECIFIED: ICD-10-CM

## 2022-09-30 DIAGNOSIS — Z85.118 PERSONAL HISTORY OF OTHER MALIGNANT NEOPLASM OF BRONCHUS AND LUNG: ICD-10-CM

## 2023-11-07 NOTE — PATIENT PROFILE ADULT - CAREGIVER RELATION TO PATIENT
Teaching Physician Attestation      Level of Participation    I discussed with the resident physician the patient's history, exam, assessment and plan in detail.  Topics listed in my addendum were the focus of the visit.  Healthcare maintenance was not addressed this visit unless listed as a topic in my addendum.  I agree with plan as written along with the following additions/modifications:    Patient left before I was able to go to the room, however senior level resident was able to discuss the plan with him, discussed as below.      Hypomania in setting of likely bipolar disorder  -no SI/HI.  Self d/c'd paroxetine 2 weeks ago.  Looks more tired, stating is having issues with erectile dysfunction.  Unfortunately has not made an appointment with psychiatry yet.    -Motivational interviewing skills utilized, again emphasized the critical nature of psychiatry follow-up  -Given paroxetine now discontinued, trazodone has been down titrated, far less concern for serotonin syndrome.  Continue BuSpar and down titrated trazodone at current dosing pending psychiatry follow-up      HTN  -no sx, repeat bp similar, moderately elevated with systolic blood pressure in 130s, however patient had just stood up to go outside and make a phone call.  -Continue lisinopril/HCTZ combination pill at current dose, encuraged pending labs    GI consult pending for crohns, apprecaite support.    Return to clinic 1 month.     son